# Patient Record
Sex: FEMALE | Race: WHITE | Employment: OTHER | URBAN - METROPOLITAN AREA
[De-identification: names, ages, dates, MRNs, and addresses within clinical notes are randomized per-mention and may not be internally consistent; named-entity substitution may affect disease eponyms.]

---

## 2018-03-13 ENCOUNTER — TELEPHONE (OUTPATIENT)
Dept: CARDIOLOGY CLINIC | Age: 83
End: 2018-03-13

## 2018-03-13 NOTE — TELEPHONE ENCOUNTER
I spoke to Amy Medley and gave her the message from Dr. Christina Corbin that is it fine for [facility] to stop Jantoven 3 days before procedure.

## 2018-03-13 NOTE — TELEPHONE ENCOUNTER
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

## 2018-05-25 ENCOUNTER — OFFICE VISIT (OUTPATIENT)
Dept: CARDIOLOGY CLINIC | Age: 83
End: 2018-05-25
Payer: MEDICARE

## 2018-05-25 VITALS
RESPIRATION RATE: 16 BRPM | HEIGHT: 58 IN | DIASTOLIC BLOOD PRESSURE: 82 MMHG | SYSTOLIC BLOOD PRESSURE: 140 MMHG | HEART RATE: 89 BPM | WEIGHT: 107 LBS | BODY MASS INDEX: 22.46 KG/M2

## 2018-05-25 DIAGNOSIS — I48.91 ATRIAL FIBRILLATION, UNSPECIFIED TYPE (HCC): Primary | ICD-10-CM

## 2018-05-25 PROCEDURE — 1036F TOBACCO NON-USER: CPT | Performed by: INTERNAL MEDICINE

## 2018-05-25 PROCEDURE — 1090F PRES/ABSN URINE INCON ASSESS: CPT | Performed by: INTERNAL MEDICINE

## 2018-05-25 PROCEDURE — G8598 ASA/ANTIPLAT THER USED: HCPCS | Performed by: INTERNAL MEDICINE

## 2018-05-25 PROCEDURE — 99214 OFFICE O/P EST MOD 30 MIN: CPT | Performed by: INTERNAL MEDICINE

## 2018-05-25 PROCEDURE — 1123F ACP DISCUSS/DSCN MKR DOCD: CPT | Performed by: INTERNAL MEDICINE

## 2018-05-25 PROCEDURE — 4040F PNEUMOC VAC/ADMIN/RCVD: CPT | Performed by: INTERNAL MEDICINE

## 2018-05-25 PROCEDURE — G8427 DOCREV CUR MEDS BY ELIG CLIN: HCPCS | Performed by: INTERNAL MEDICINE

## 2018-05-25 PROCEDURE — 93000 ELECTROCARDIOGRAM COMPLETE: CPT | Performed by: INTERNAL MEDICINE

## 2018-05-25 PROCEDURE — G8420 CALC BMI NORM PARAMETERS: HCPCS | Performed by: INTERNAL MEDICINE

## 2018-07-03 PROBLEM — I63.50 CEREBRAL ARTERY OCCLUSION WITH CEREBRAL INFARCTION (HCC): Status: ACTIVE | Noted: 2018-07-03

## 2019-01-01 ENCOUNTER — HOSPITAL ENCOUNTER (EMERGENCY)
Age: 84
Discharge: HOME | End: 2019-06-09
Attending: EMERGENCY MEDICINE
Payer: MEDICARE

## 2019-01-01 ENCOUNTER — APPOINTMENT (OUTPATIENT)
Dept: GENERAL RADIOLOGY | Age: 84
DRG: 388 | End: 2019-01-01
Attending: INTERNAL MEDICINE
Payer: MEDICARE

## 2019-01-01 ENCOUNTER — APPOINTMENT (OUTPATIENT)
Dept: GENERAL RADIOLOGY | Age: 84
End: 2019-01-01
Payer: MEDICARE

## 2019-01-01 ENCOUNTER — APPOINTMENT (OUTPATIENT)
Dept: CT IMAGING | Age: 84
DRG: 388 | End: 2019-01-01
Payer: MEDICARE

## 2019-01-01 ENCOUNTER — HOSPITAL ENCOUNTER (INPATIENT)
Age: 84
LOS: 5 days | Discharge: NURSING, REHAB OR LONG-TERM CARE | DRG: 388 | End: 2019-06-15
Attending: INTERNAL MEDICINE | Admitting: INTERNAL MEDICINE
Payer: MEDICARE

## 2019-01-01 ENCOUNTER — APPOINTMENT (OUTPATIENT)
Dept: CT IMAGING | Age: 84
DRG: 388 | End: 2019-01-01
Attending: INTERNAL MEDICINE
Payer: MEDICARE

## 2019-01-01 ENCOUNTER — HOSPITAL ENCOUNTER (EMERGENCY)
Age: 84
Discharge: HOME | DRG: 388 | End: 2019-06-08
Attending: EMERGENCY MEDICINE
Payer: MEDICARE

## 2019-01-01 VITALS
HEART RATE: 95 BPM | HEIGHT: 59 IN | BODY MASS INDEX: 21.31 KG/M2 | DIASTOLIC BLOOD PRESSURE: 76 MMHG | WEIGHT: 105.7 LBS | SYSTOLIC BLOOD PRESSURE: 126 MMHG | OXYGEN SATURATION: 94 % | RESPIRATION RATE: 16 BRPM | TEMPERATURE: 98 F

## 2019-01-01 VITALS
DIASTOLIC BLOOD PRESSURE: 88 MMHG | OXYGEN SATURATION: 95 % | WEIGHT: 105 LBS | SYSTOLIC BLOOD PRESSURE: 126 MMHG | RESPIRATION RATE: 22 BRPM | HEIGHT: 59 IN | HEART RATE: 105 BPM | BODY MASS INDEX: 21.17 KG/M2 | TEMPERATURE: 97.1 F

## 2019-01-01 VITALS
TEMPERATURE: 98.2 F | BODY MASS INDEX: 21.17 KG/M2 | DIASTOLIC BLOOD PRESSURE: 87 MMHG | OXYGEN SATURATION: 94 % | RESPIRATION RATE: 16 BRPM | HEART RATE: 87 BPM | HEIGHT: 59 IN | WEIGHT: 105 LBS | SYSTOLIC BLOOD PRESSURE: 180 MMHG

## 2019-01-01 DIAGNOSIS — R10.13 ABDOMINAL PAIN, EPIGASTRIC: Primary | ICD-10-CM

## 2019-01-01 DIAGNOSIS — R10.84 GENERALIZED ABDOMINAL PAIN: Primary | ICD-10-CM

## 2019-01-01 LAB
ALBUMIN SERPL-MCNC: 3.7 G/DL (ref 3.5–5.2)
ALBUMIN SERPL-MCNC: 4.1 G/DL (ref 3.5–5.2)
ALP BLD-CCNC: 69 U/L (ref 35–104)
ALP BLD-CCNC: 85 U/L (ref 35–104)
ALT SERPL-CCNC: 15 U/L (ref 0–32)
ALT SERPL-CCNC: 22 U/L (ref 0–32)
ANION GAP SERPL CALCULATED.3IONS-SCNC: 10 MMOL/L (ref 7–16)
ANION GAP SERPL CALCULATED.3IONS-SCNC: 10 MMOL/L (ref 7–16)
ANION GAP SERPL CALCULATED.3IONS-SCNC: 13 MMOL/L (ref 7–16)
ANION GAP SERPL CALCULATED.3IONS-SCNC: 14 MMOL/L (ref 7–16)
ANION GAP SERPL CALCULATED.3IONS-SCNC: 14 MMOL/L (ref 7–16)
ANION GAP SERPL CALCULATED.3IONS-SCNC: 16 MMOL/L (ref 7–16)
ANION GAP SERPL CALCULATED.3IONS-SCNC: 9 MMOL/L (ref 7–16)
ANION GAP SERPL CALCULATED.3IONS-SCNC: 9 MMOL/L (ref 7–16)
AST SERPL-CCNC: 30 U/L (ref 0–31)
AST SERPL-CCNC: 31 U/L (ref 0–31)
BACTERIA: ABNORMAL /HPF
BASOPHILS ABSOLUTE: 0.01 E9/L (ref 0–0.2)
BASOPHILS ABSOLUTE: 0.06 E9/L (ref 0–0.2)
BASOPHILS RELATIVE PERCENT: 0.1 % (ref 0–2)
BASOPHILS RELATIVE PERCENT: 0.5 % (ref 0–2)
BILIRUB SERPL-MCNC: 0.8 MG/DL (ref 0–1.2)
BILIRUB SERPL-MCNC: 1.1 MG/DL (ref 0–1.2)
BILIRUBIN URINE: NEGATIVE
BLOOD, URINE: ABNORMAL
BUN BLDV-MCNC: 10 MG/DL (ref 8–23)
BUN BLDV-MCNC: 13 MG/DL (ref 8–23)
BUN BLDV-MCNC: 20 MG/DL (ref 8–23)
BUN BLDV-MCNC: 21 MG/DL (ref 8–23)
BUN BLDV-MCNC: 22 MG/DL (ref 8–23)
BUN BLDV-MCNC: 23 MG/DL (ref 8–23)
BUN BLDV-MCNC: 24 MG/DL (ref 8–23)
BUN BLDV-MCNC: 29 MG/DL (ref 8–23)
BURR CELLS: ABNORMAL
CALCIUM SERPL-MCNC: 8.2 MG/DL (ref 8.6–10.2)
CALCIUM SERPL-MCNC: 8.5 MG/DL (ref 8.6–10.2)
CALCIUM SERPL-MCNC: 8.7 MG/DL (ref 8.6–10.2)
CALCIUM SERPL-MCNC: 8.7 MG/DL (ref 8.6–10.2)
CALCIUM SERPL-MCNC: 9 MG/DL (ref 8.6–10.2)
CALCIUM SERPL-MCNC: 9.1 MG/DL (ref 8.6–10.2)
CALCIUM SERPL-MCNC: 9.4 MG/DL (ref 8.6–10.2)
CALCIUM SERPL-MCNC: 9.4 MG/DL (ref 8.6–10.2)
CHLORIDE BLD-SCNC: 92 MMOL/L (ref 98–107)
CHLORIDE BLD-SCNC: 93 MMOL/L (ref 98–107)
CHLORIDE BLD-SCNC: 93 MMOL/L (ref 98–107)
CHLORIDE BLD-SCNC: 94 MMOL/L (ref 98–107)
CHLORIDE BLD-SCNC: 95 MMOL/L (ref 98–107)
CHLORIDE BLD-SCNC: 95 MMOL/L (ref 98–107)
CHLORIDE BLD-SCNC: 97 MMOL/L (ref 98–107)
CHLORIDE BLD-SCNC: 99 MMOL/L (ref 98–107)
CHLORIDE URINE RANDOM: 28 MMOL/L
CLARITY: CLEAR
CO2: 17 MMOL/L (ref 22–29)
CO2: 22 MMOL/L (ref 22–29)
CO2: 23 MMOL/L (ref 22–29)
CO2: 25 MMOL/L (ref 22–29)
CO2: 25 MMOL/L (ref 22–29)
CO2: 28 MMOL/L (ref 22–29)
COLOR: YELLOW
CREAT SERPL-MCNC: 0.7 MG/DL (ref 0.5–1)
CREAT SERPL-MCNC: 0.8 MG/DL (ref 0.5–1)
CREATININE URINE: 99 MG/DL (ref 29–226)
DIGOXIN LEVEL: 0.4 NG/ML (ref 0.8–2)
DIGOXIN LEVEL: 0.5 NG/ML (ref 0.8–2)
EKG ATRIAL RATE: 100 BPM
EKG ATRIAL RATE: 96 BPM
EKG Q-T INTERVAL: 306 MS
EKG Q-T INTERVAL: 320 MS
EKG QRS DURATION: 72 MS
EKG QRS DURATION: 76 MS
EKG QTC CALCULATION (BAZETT): 402 MS
EKG QTC CALCULATION (BAZETT): 438 MS
EKG R AXIS: 0 DEGREES
EKG R AXIS: 84 DEGREES
EKG T AXIS: -176 DEGREES
EKG T AXIS: -92 DEGREES
EKG VENTRICULAR RATE: 123 BPM
EKG VENTRICULAR RATE: 95 BPM
EOSINOPHILS ABSOLUTE: 0.01 E9/L (ref 0.05–0.5)
EOSINOPHILS ABSOLUTE: 0.03 E9/L (ref 0.05–0.5)
EOSINOPHILS RELATIVE PERCENT: 0.1 % (ref 0–6)
EOSINOPHILS RELATIVE PERCENT: 0.2 % (ref 0–6)
EPITHELIAL CELLS, UA: ABNORMAL /HPF
GFR AFRICAN AMERICAN: >60
GFR NON-AFRICAN AMERICAN: >60 ML/MIN/1.73
GLUCOSE BLD-MCNC: 106 MG/DL (ref 74–99)
GLUCOSE BLD-MCNC: 112 MG/DL (ref 74–99)
GLUCOSE BLD-MCNC: 134 MG/DL (ref 74–99)
GLUCOSE BLD-MCNC: 77 MG/DL (ref 74–99)
GLUCOSE BLD-MCNC: 79 MG/DL (ref 74–99)
GLUCOSE BLD-MCNC: 83 MG/DL (ref 74–99)
GLUCOSE BLD-MCNC: 87 MG/DL (ref 74–99)
GLUCOSE BLD-MCNC: 94 MG/DL (ref 74–99)
GLUCOSE URINE: NEGATIVE MG/DL
HCT VFR BLD CALC: 43.9 % (ref 34–48)
HCT VFR BLD CALC: 44.6 % (ref 34–48)
HCT VFR BLD CALC: 46.1 % (ref 34–48)
HEMOGLOBIN: 14.9 G/DL (ref 11.5–15.5)
HEMOGLOBIN: 15.1 G/DL (ref 11.5–15.5)
HEMOGLOBIN: 15.1 G/DL (ref 11.5–15.5)
IMMATURE GRANULOCYTES #: 0.05 E9/L
IMMATURE GRANULOCYTES #: 0.05 E9/L
IMMATURE GRANULOCYTES %: 0.4 % (ref 0–5)
IMMATURE GRANULOCYTES %: 0.5 % (ref 0–5)
INR BLD: 1.1
INR BLD: 1.1
INR BLD: 1.4
INR BLD: 1.7
INR BLD: 4.6
INR BLD: 7.6
KETONES, URINE: 15 MG/DL
LACTIC ACID: 1.1 MMOL/L (ref 0.5–2.2)
LACTIC ACID: 1.8 MMOL/L (ref 0.5–2.2)
LACTIC ACID: 3 MMOL/L (ref 0.5–2.2)
LEUKOCYTE ESTERASE, URINE: ABNORMAL
LIPASE: 17 U/L (ref 13–60)
LIPASE: 29 U/L (ref 13–60)
LYMPHOCYTES ABSOLUTE: 0.47 E9/L (ref 1.5–4)
LYMPHOCYTES ABSOLUTE: 0.89 E9/L (ref 1.5–4)
LYMPHOCYTES RELATIVE PERCENT: 4.5 % (ref 20–42)
LYMPHOCYTES RELATIVE PERCENT: 7.2 % (ref 20–42)
MAGNESIUM: 1.7 MG/DL (ref 1.6–2.6)
MCH RBC QN AUTO: 32.4 PG (ref 26–35)
MCH RBC QN AUTO: 32.4 PG (ref 26–35)
MCH RBC QN AUTO: 32.9 PG (ref 26–35)
MCHC RBC AUTO-ENTMCNC: 32.8 % (ref 32–34.5)
MCHC RBC AUTO-ENTMCNC: 33.4 % (ref 32–34.5)
MCHC RBC AUTO-ENTMCNC: 34.4 % (ref 32–34.5)
MCV RBC AUTO: 95.6 FL (ref 80–99.9)
MCV RBC AUTO: 97 FL (ref 80–99.9)
MCV RBC AUTO: 98.9 FL (ref 80–99.9)
MONOCYTES ABSOLUTE: 0.6 E9/L (ref 0.1–0.95)
MONOCYTES ABSOLUTE: 0.81 E9/L (ref 0.1–0.95)
MONOCYTES RELATIVE PERCENT: 5.8 % (ref 2–12)
MONOCYTES RELATIVE PERCENT: 6.6 % (ref 2–12)
NEUTROPHILS ABSOLUTE: 10.52 E9/L (ref 1.8–7.3)
NEUTROPHILS ABSOLUTE: 9.29 E9/L (ref 1.8–7.3)
NEUTROPHILS RELATIVE PERCENT: 85.1 % (ref 43–80)
NEUTROPHILS RELATIVE PERCENT: 89 % (ref 43–80)
NITRITE, URINE: NEGATIVE
OVALOCYTES: ABNORMAL
PDW BLD-RTO: 12.6 FL (ref 11.5–15)
PDW BLD-RTO: 12.7 FL (ref 11.5–15)
PDW BLD-RTO: 13 FL (ref 11.5–15)
PH UA: 7 (ref 5–9)
PLATELET # BLD: 193 E9/L (ref 130–450)
PLATELET # BLD: 205 E9/L (ref 130–450)
PLATELET # BLD: 214 E9/L (ref 130–450)
PMV BLD AUTO: 10.1 FL (ref 7–12)
PMV BLD AUTO: 10.3 FL (ref 7–12)
PMV BLD AUTO: 10.6 FL (ref 7–12)
POIKILOCYTES: ABNORMAL
POLYCHROMASIA: ABNORMAL
POTASSIUM SERPL-SCNC: 3.4 MMOL/L (ref 3.5–5)
POTASSIUM SERPL-SCNC: 3.8 MMOL/L (ref 3.5–5)
POTASSIUM SERPL-SCNC: 4 MMOL/L (ref 3.5–5)
POTASSIUM SERPL-SCNC: 4.2 MMOL/L (ref 3.5–5)
POTASSIUM SERPL-SCNC: 4.2 MMOL/L (ref 3.5–5)
POTASSIUM SERPL-SCNC: 4.6 MMOL/L (ref 3.5–5)
POTASSIUM SERPL-SCNC: 4.7 MMOL/L (ref 3.5–5)
POTASSIUM SERPL-SCNC: 4.7 MMOL/L (ref 3.5–5)
PRO-BNP: 3876 PG/ML (ref 0–450)
PROTEIN UA: NEGATIVE MG/DL
PROTHROMBIN TIME: 12.6 SEC (ref 9.3–12.4)
PROTHROMBIN TIME: 12.8 SEC (ref 9.3–12.4)
PROTHROMBIN TIME: 15.9 SEC (ref 9.3–12.4)
PROTHROMBIN TIME: 18.9 SEC (ref 9.3–12.4)
PROTHROMBIN TIME: 52.2 SEC (ref 9.3–12.4)
PROTHROMBIN TIME: 84.2 SEC (ref 9.3–12.4)
RBC # BLD: 4.59 E12/L (ref 3.5–5.5)
RBC # BLD: 4.6 E12/L (ref 3.5–5.5)
RBC # BLD: 4.66 E12/L (ref 3.5–5.5)
RBC UA: ABNORMAL /HPF (ref 0–2)
REASON FOR REJECTION: NORMAL
REJECTED TEST: NORMAL
RENAL EPITHELIAL, UA: ABNORMAL /HPF
SODIUM BLD-SCNC: 125 MMOL/L (ref 132–146)
SODIUM BLD-SCNC: 128 MMOL/L (ref 132–146)
SODIUM BLD-SCNC: 128 MMOL/L (ref 132–146)
SODIUM BLD-SCNC: 129 MMOL/L (ref 132–146)
SODIUM BLD-SCNC: 131 MMOL/L (ref 132–146)
SODIUM BLD-SCNC: 131 MMOL/L (ref 132–146)
SODIUM BLD-SCNC: 132 MMOL/L (ref 132–146)
SODIUM BLD-SCNC: 133 MMOL/L (ref 132–146)
SODIUM URINE: <20 MMOL/L
SPECIFIC GRAVITY UA: 1.01 (ref 1–1.03)
TOTAL PROTEIN: 6.3 G/DL (ref 6.4–8.3)
TOTAL PROTEIN: 7.3 G/DL (ref 6.4–8.3)
TROPONIN: <0.01 NG/ML (ref 0–0.03)
TROPONIN: <0.01 NG/ML (ref 0–0.03)
UROBILINOGEN, URINE: 0.2 E.U./DL
WBC # BLD: 10.4 E9/L (ref 4.5–11.5)
WBC # BLD: 12.4 E9/L (ref 4.5–11.5)
WBC # BLD: 9.4 E9/L (ref 4.5–11.5)
WBC UA: ABNORMAL /HPF (ref 0–5)

## 2019-01-01 PROCEDURE — 96374 THER/PROPH/DIAG INJ IV PUSH: CPT

## 2019-01-01 PROCEDURE — 85610 PROTHROMBIN TIME: CPT

## 2019-01-01 PROCEDURE — 1200000000 HC SEMI PRIVATE

## 2019-01-01 PROCEDURE — 97530 THERAPEUTIC ACTIVITIES: CPT

## 2019-01-01 PROCEDURE — 6360000002 HC RX W HCPCS: Performed by: EMERGENCY MEDICINE

## 2019-01-01 PROCEDURE — 96375 TX/PRO/DX INJ NEW DRUG ADDON: CPT

## 2019-01-01 PROCEDURE — 83605 ASSAY OF LACTIC ACID: CPT

## 2019-01-01 PROCEDURE — 36415 COLL VENOUS BLD VENIPUNCTURE: CPT

## 2019-01-01 PROCEDURE — 83735 ASSAY OF MAGNESIUM: CPT

## 2019-01-01 PROCEDURE — 6360000002 HC RX W HCPCS: Performed by: INTERNAL MEDICINE

## 2019-01-01 PROCEDURE — 84300 ASSAY OF URINE SODIUM: CPT

## 2019-01-01 PROCEDURE — 80048 BASIC METABOLIC PNL TOTAL CA: CPT

## 2019-01-01 PROCEDURE — 2580000003 HC RX 258: Performed by: RADIOLOGY

## 2019-01-01 PROCEDURE — 82436 ASSAY OF URINE CHLORIDE: CPT

## 2019-01-01 PROCEDURE — 81001 URINALYSIS AUTO W/SCOPE: CPT

## 2019-01-01 PROCEDURE — 74177 CT ABD & PELVIS W/CONTRAST: CPT

## 2019-01-01 PROCEDURE — 6370000000 HC RX 637 (ALT 250 FOR IP): Performed by: INTERNAL MEDICINE

## 2019-01-01 PROCEDURE — 80053 COMPREHEN METABOLIC PANEL: CPT

## 2019-01-01 PROCEDURE — 82570 ASSAY OF URINE CREATININE: CPT

## 2019-01-01 PROCEDURE — 80162 ASSAY OF DIGOXIN TOTAL: CPT

## 2019-01-01 PROCEDURE — 6360000002 HC RX W HCPCS: Performed by: STUDENT IN AN ORGANIZED HEALTH CARE EDUCATION/TRAINING PROGRAM

## 2019-01-01 PROCEDURE — 83690 ASSAY OF LIPASE: CPT

## 2019-01-01 PROCEDURE — 2580000003 HC RX 258: Performed by: NURSE PRACTITIONER

## 2019-01-01 PROCEDURE — 85027 COMPLETE CBC AUTOMATED: CPT

## 2019-01-01 PROCEDURE — 85025 COMPLETE CBC W/AUTO DIFF WBC: CPT

## 2019-01-01 PROCEDURE — 93010 ELECTROCARDIOGRAM REPORT: CPT | Performed by: INTERNAL MEDICINE

## 2019-01-01 PROCEDURE — 2580000003 HC RX 258: Performed by: INTERNAL MEDICINE

## 2019-01-01 PROCEDURE — 6360000004 HC RX CONTRAST MEDICATION: Performed by: RADIOLOGY

## 2019-01-01 PROCEDURE — 99284 EMERGENCY DEPT VISIT MOD MDM: CPT

## 2019-01-01 PROCEDURE — 97165 OT EVAL LOW COMPLEX 30 MIN: CPT

## 2019-01-01 PROCEDURE — 93005 ELECTROCARDIOGRAM TRACING: CPT | Performed by: NURSE PRACTITIONER

## 2019-01-01 PROCEDURE — 93005 ELECTROCARDIOGRAM TRACING: CPT | Performed by: EMERGENCY MEDICINE

## 2019-01-01 PROCEDURE — 71045 X-RAY EXAM CHEST 1 VIEW: CPT

## 2019-01-01 PROCEDURE — 84484 ASSAY OF TROPONIN QUANT: CPT

## 2019-01-01 PROCEDURE — 74018 RADEX ABDOMEN 1 VIEW: CPT

## 2019-01-01 PROCEDURE — 97161 PT EVAL LOW COMPLEX 20 MIN: CPT

## 2019-01-01 PROCEDURE — 83880 ASSAY OF NATRIURETIC PEPTIDE: CPT

## 2019-01-01 PROCEDURE — 2580000003 HC RX 258: Performed by: EMERGENCY MEDICINE

## 2019-01-01 PROCEDURE — 6360000002 HC RX W HCPCS: Performed by: NURSE PRACTITIONER

## 2019-01-01 PROCEDURE — 97535 SELF CARE MNGMENT TRAINING: CPT

## 2019-01-01 RX ORDER — METOPROLOL TARTRATE 50 MG/1
50 TABLET, FILM COATED ORAL 2 TIMES DAILY
Status: DISCONTINUED | OUTPATIENT
Start: 2019-01-01 | End: 2019-01-01 | Stop reason: HOSPADM

## 2019-01-01 RX ORDER — WARFARIN SODIUM 5 MG/1
TABLET ORAL
Qty: 30 TABLET | Refills: 3 | Status: SHIPPED | OUTPATIENT
Start: 2019-01-01

## 2019-01-01 RX ORDER — FUROSEMIDE 10 MG/ML
20 INJECTION INTRAMUSCULAR; INTRAVENOUS ONCE
Status: COMPLETED | OUTPATIENT
Start: 2019-01-01 | End: 2019-01-01

## 2019-01-01 RX ORDER — SUCRALFATE 1 G/1
1 TABLET ORAL 4 TIMES DAILY
Status: DISCONTINUED | OUTPATIENT
Start: 2019-01-01 | End: 2019-01-01 | Stop reason: HOSPADM

## 2019-01-01 RX ORDER — ACETAMINOPHEN 325 MG/1
650 TABLET ORAL EVERY 4 HOURS PRN
Status: DISCONTINUED | OUTPATIENT
Start: 2019-01-01 | End: 2019-01-01 | Stop reason: HOSPADM

## 2019-01-01 RX ORDER — SODIUM CHLORIDE 9 MG/ML
INJECTION, SOLUTION INTRAVENOUS CONTINUOUS
Status: DISCONTINUED | OUTPATIENT
Start: 2019-01-01 | End: 2019-01-01

## 2019-01-01 RX ORDER — SUCRALFATE 1 G/1
1 TABLET ORAL 4 TIMES DAILY
Qty: 60 TABLET | Refills: 3 | Status: SHIPPED | OUTPATIENT
Start: 2019-01-01

## 2019-01-01 RX ORDER — SIMVASTATIN 40 MG
40 TABLET ORAL NIGHTLY
Status: DISCONTINUED | OUTPATIENT
Start: 2019-01-01 | End: 2019-01-01 | Stop reason: HOSPADM

## 2019-01-01 RX ORDER — ONDANSETRON 4 MG/1
4 TABLET, ORALLY DISINTEGRATING ORAL EVERY 8 HOURS PRN
Qty: 12 TABLET | Refills: 0 | Status: SHIPPED | OUTPATIENT
Start: 2019-01-01

## 2019-01-01 RX ORDER — DIGOXIN 125 MCG
125 TABLET ORAL DAILY
Status: DISCONTINUED | OUTPATIENT
Start: 2019-01-01 | End: 2019-01-01 | Stop reason: HOSPADM

## 2019-01-01 RX ORDER — LISINOPRIL 20 MG/1
20 TABLET ORAL DAILY
Status: DISCONTINUED | OUTPATIENT
Start: 2019-01-01 | End: 2019-01-01 | Stop reason: HOSPADM

## 2019-01-01 RX ORDER — FUROSEMIDE 20 MG/1
20 TABLET ORAL DAILY
Qty: 60 TABLET | Refills: 3 | Status: SHIPPED | OUTPATIENT
Start: 2019-01-01

## 2019-01-01 RX ORDER — ONDANSETRON 2 MG/ML
4 INJECTION INTRAMUSCULAR; INTRAVENOUS EVERY 6 HOURS PRN
Status: DISCONTINUED | OUTPATIENT
Start: 2019-01-01 | End: 2019-01-01 | Stop reason: HOSPADM

## 2019-01-01 RX ORDER — POTASSIUM CHLORIDE 20 MEQ/1
40 TABLET, EXTENDED RELEASE ORAL ONCE
Status: COMPLETED | OUTPATIENT
Start: 2019-01-01 | End: 2019-01-01

## 2019-01-01 RX ORDER — AMLODIPINE BESYLATE 5 MG/1
5 TABLET ORAL DAILY
Status: DISCONTINUED | OUTPATIENT
Start: 2019-01-01 | End: 2019-01-01 | Stop reason: HOSPADM

## 2019-01-01 RX ORDER — ASCORBIC ACID 500 MG
500 TABLET ORAL DAILY
Status: DISCONTINUED | OUTPATIENT
Start: 2019-01-01 | End: 2019-01-01 | Stop reason: HOSPADM

## 2019-01-01 RX ORDER — ONDANSETRON 2 MG/ML
4 INJECTION INTRAMUSCULAR; INTRAVENOUS ONCE
Status: COMPLETED | OUTPATIENT
Start: 2019-01-01 | End: 2019-01-01

## 2019-01-01 RX ORDER — AMLODIPINE BESYLATE 5 MG/1
5 TABLET ORAL DAILY
Qty: 30 TABLET | Refills: 3 | Status: SHIPPED | OUTPATIENT
Start: 2019-01-01

## 2019-01-01 RX ORDER — MORPHINE SULFATE 2 MG/ML
1 INJECTION, SOLUTION INTRAMUSCULAR; INTRAVENOUS ONCE
Status: COMPLETED | OUTPATIENT
Start: 2019-01-01 | End: 2019-01-01

## 2019-01-01 RX ORDER — SODIUM CHLORIDE 0.9 % (FLUSH) 0.9 %
10 SYRINGE (ML) INJECTION PRN
Status: DISCONTINUED | OUTPATIENT
Start: 2019-01-01 | End: 2019-01-01 | Stop reason: HOSPADM

## 2019-01-01 RX ORDER — WARFARIN SODIUM 5 MG/1
5 TABLET ORAL DAILY
Status: DISCONTINUED | OUTPATIENT
Start: 2019-01-01 | End: 2019-01-01 | Stop reason: HOSPADM

## 2019-01-01 RX ORDER — CHOLECALCIFEROL (VITAMIN D3) 50 MCG
2000 TABLET ORAL DAILY
Status: DISCONTINUED | OUTPATIENT
Start: 2019-01-01 | End: 2019-01-01 | Stop reason: HOSPADM

## 2019-01-01 RX ORDER — 0.9 % SODIUM CHLORIDE 0.9 %
1000 INTRAVENOUS SOLUTION INTRAVENOUS ONCE
Status: COMPLETED | OUTPATIENT
Start: 2019-01-01 | End: 2019-01-01

## 2019-01-01 RX ORDER — 0.9 % SODIUM CHLORIDE 0.9 %
250 INTRAVENOUS SOLUTION INTRAVENOUS ONCE
Status: COMPLETED | OUTPATIENT
Start: 2019-01-01 | End: 2019-01-01

## 2019-01-01 RX ORDER — KETOROLAC TROMETHAMINE 30 MG/ML
15 INJECTION, SOLUTION INTRAMUSCULAR; INTRAVENOUS ONCE
Status: COMPLETED | OUTPATIENT
Start: 2019-01-01 | End: 2019-01-01

## 2019-01-01 RX ADMIN — KETOROLAC TROMETHAMINE 15 MG: 30 INJECTION, SOLUTION INTRAMUSCULAR at 11:52

## 2019-01-01 RX ADMIN — SUCRALFATE 1 G: 1 TABLET ORAL at 07:44

## 2019-01-01 RX ADMIN — METOPROLOL TARTRATE 50 MG: 50 TABLET ORAL at 20:05

## 2019-01-01 RX ADMIN — SUCRALFATE 1 G: 1 TABLET ORAL at 14:10

## 2019-01-01 RX ADMIN — SIMVASTATIN 40 MG: 40 TABLET, FILM COATED ORAL at 20:34

## 2019-01-01 RX ADMIN — ACETAMINOPHEN 650 MG: 325 TABLET ORAL at 04:00

## 2019-01-01 RX ADMIN — LISINOPRIL 20 MG: 20 TABLET ORAL at 10:16

## 2019-01-01 RX ADMIN — SIMVASTATIN 40 MG: 40 TABLET, FILM COATED ORAL at 21:17

## 2019-01-01 RX ADMIN — IOPAMIDOL 110 ML: 755 INJECTION, SOLUTION INTRAVENOUS at 09:49

## 2019-01-01 RX ADMIN — Medication 10 ML: at 10:14

## 2019-01-01 RX ADMIN — SUCRALFATE 1 G: 1 TABLET ORAL at 09:46

## 2019-01-01 RX ADMIN — DIGOXIN 125 MCG: 125 TABLET ORAL at 08:20

## 2019-01-01 RX ADMIN — AMLODIPINE BESYLATE 5 MG: 5 TABLET ORAL at 16:50

## 2019-01-01 RX ADMIN — CHOLECALCIFEROL TAB 50 MCG (2000 UNIT) 2000 UNITS: 50 TAB at 10:13

## 2019-01-01 RX ADMIN — IOPAMIDOL 110 ML: 755 INJECTION, SOLUTION INTRAVENOUS at 21:07

## 2019-01-01 RX ADMIN — Medication 500 MG: at 09:23

## 2019-01-01 RX ADMIN — DIGOXIN 125 MCG: 125 TABLET ORAL at 09:46

## 2019-01-01 RX ADMIN — SUCRALFATE 1 G: 1 TABLET ORAL at 20:05

## 2019-01-01 RX ADMIN — POTASSIUM CHLORIDE 40 MEQ: 20 TABLET, EXTENDED RELEASE ORAL at 11:48

## 2019-01-01 RX ADMIN — SIMVASTATIN 40 MG: 40 TABLET, FILM COATED ORAL at 21:10

## 2019-01-01 RX ADMIN — CHOLECALCIFEROL TAB 50 MCG (2000 UNIT) 2000 UNITS: 50 TAB at 07:44

## 2019-01-01 RX ADMIN — LISINOPRIL 20 MG: 20 TABLET ORAL at 09:23

## 2019-01-01 RX ADMIN — SIMVASTATIN 40 MG: 40 TABLET, FILM COATED ORAL at 20:05

## 2019-01-01 RX ADMIN — LISINOPRIL 20 MG: 20 TABLET ORAL at 07:44

## 2019-01-01 RX ADMIN — Medication 500 MG: at 18:08

## 2019-01-01 RX ADMIN — SUCRALFATE 1 G: 1 TABLET ORAL at 21:17

## 2019-01-01 RX ADMIN — Medication 10 ML: at 20:35

## 2019-01-01 RX ADMIN — AMLODIPINE BESYLATE 5 MG: 5 TABLET ORAL at 10:13

## 2019-01-01 RX ADMIN — METOPROLOL TARTRATE 50 MG: 50 TABLET ORAL at 21:17

## 2019-01-01 RX ADMIN — MORPHINE SULFATE 1 MG: 2 INJECTION, SOLUTION INTRAMUSCULAR; INTRAVENOUS at 20:15

## 2019-01-01 RX ADMIN — LISINOPRIL 20 MG: 20 TABLET ORAL at 09:46

## 2019-01-01 RX ADMIN — DIGOXIN 125 MCG: 125 TABLET ORAL at 07:43

## 2019-01-01 RX ADMIN — ONDANSETRON 4 MG: 2 INJECTION INTRAMUSCULAR; INTRAVENOUS at 20:05

## 2019-01-01 RX ADMIN — ONDANSETRON 4 MG: 2 INJECTION INTRAMUSCULAR; INTRAVENOUS at 21:10

## 2019-01-01 RX ADMIN — SUCRALFATE 1 G: 1 TABLET ORAL at 16:50

## 2019-01-01 RX ADMIN — ACETAMINOPHEN 650 MG: 325 TABLET ORAL at 19:46

## 2019-01-01 RX ADMIN — CHOLECALCIFEROL TAB 50 MCG (2000 UNIT) 2000 UNITS: 50 TAB at 09:46

## 2019-01-01 RX ADMIN — SUCRALFATE 1 G: 1 TABLET ORAL at 18:08

## 2019-01-01 RX ADMIN — Medication 10 ML: at 09:49

## 2019-01-01 RX ADMIN — METOPROLOL TARTRATE 50 MG: 50 TABLET ORAL at 07:44

## 2019-01-01 RX ADMIN — Medication 500 MG: at 07:44

## 2019-01-01 RX ADMIN — DIGOXIN 125 MCG: 125 TABLET ORAL at 18:08

## 2019-01-01 RX ADMIN — SUCRALFATE 1 G: 1 TABLET ORAL at 17:33

## 2019-01-01 RX ADMIN — SUCRALFATE 1 G: 1 TABLET ORAL at 09:24

## 2019-01-01 RX ADMIN — METOPROLOL TARTRATE 50 MG: 50 TABLET ORAL at 20:35

## 2019-01-01 RX ADMIN — SUCRALFATE 1 G: 1 TABLET ORAL at 10:13

## 2019-01-01 RX ADMIN — CHOLECALCIFEROL TAB 50 MCG (2000 UNIT) 2000 UNITS: 50 TAB at 09:23

## 2019-01-01 RX ADMIN — SUCRALFATE 1 G: 1 TABLET ORAL at 13:06

## 2019-01-01 RX ADMIN — METOPROLOL TARTRATE 50 MG: 50 TABLET ORAL at 08:20

## 2019-01-01 RX ADMIN — ENOXAPARIN SODIUM 50 MG: 100 INJECTION SUBCUTANEOUS at 20:34

## 2019-01-01 RX ADMIN — LISINOPRIL 20 MG: 20 TABLET ORAL at 08:20

## 2019-01-01 RX ADMIN — FUROSEMIDE 20 MG: 10 INJECTION, SOLUTION INTRAVENOUS at 10:13

## 2019-01-01 RX ADMIN — Medication 500 MG: at 10:13

## 2019-01-01 RX ADMIN — SODIUM CHLORIDE: 9 INJECTION, SOLUTION INTRAVENOUS at 18:08

## 2019-01-01 RX ADMIN — FUROSEMIDE 20 MG: 10 INJECTION, SOLUTION INTRAVENOUS at 07:30

## 2019-01-01 RX ADMIN — METOPROLOL TARTRATE 50 MG: 50 TABLET ORAL at 10:13

## 2019-01-01 RX ADMIN — SODIUM CHLORIDE 1000 ML: 9 INJECTION, SOLUTION INTRAVENOUS at 11:52

## 2019-01-01 RX ADMIN — METOPROLOL TARTRATE 50 MG: 50 TABLET ORAL at 21:10

## 2019-01-01 RX ADMIN — SUCRALFATE 1 G: 1 TABLET ORAL at 21:10

## 2019-01-01 RX ADMIN — ENOXAPARIN SODIUM 50 MG: 100 INJECTION SUBCUTANEOUS at 09:24

## 2019-01-01 RX ADMIN — IOHEXOL 50 ML: 240 INJECTION, SOLUTION INTRATHECAL; INTRAVASCULAR; INTRAVENOUS; ORAL at 09:48

## 2019-01-01 RX ADMIN — SUCRALFATE 1 G: 1 TABLET ORAL at 14:20

## 2019-01-01 RX ADMIN — ACETAMINOPHEN 650 MG: 325 TABLET ORAL at 12:55

## 2019-01-01 RX ADMIN — METOPROLOL TARTRATE 50 MG: 50 TABLET ORAL at 09:23

## 2019-01-01 RX ADMIN — CHOLECALCIFEROL TAB 50 MCG (2000 UNIT) 2000 UNITS: 50 TAB at 08:20

## 2019-01-01 RX ADMIN — WARFARIN SODIUM 5 MG: 5 TABLET ORAL at 17:33

## 2019-01-01 RX ADMIN — ENOXAPARIN SODIUM 50 MG: 100 INJECTION SUBCUTANEOUS at 10:14

## 2019-01-01 RX ADMIN — AMLODIPINE BESYLATE 5 MG: 5 TABLET ORAL at 08:20

## 2019-01-01 RX ADMIN — METOPROLOL TARTRATE 50 MG: 50 TABLET ORAL at 09:46

## 2019-01-01 RX ADMIN — AMLODIPINE BESYLATE 5 MG: 5 TABLET ORAL at 09:23

## 2019-01-01 RX ADMIN — CHOLECALCIFEROL TAB 50 MCG (2000 UNIT) 2000 UNITS: 50 TAB at 18:08

## 2019-01-01 RX ADMIN — SODIUM CHLORIDE 250 ML: 9 INJECTION, SOLUTION INTRAVENOUS at 20:14

## 2019-01-01 RX ADMIN — SUCRALFATE 1 G: 1 TABLET ORAL at 17:08

## 2019-01-01 RX ADMIN — Medication 500 MG: at 09:46

## 2019-01-01 RX ADMIN — DIGOXIN 125 MCG: 125 TABLET ORAL at 10:14

## 2019-01-01 RX ADMIN — ONDANSETRON HYDROCHLORIDE 4 MG: 2 SOLUTION INTRAMUSCULAR; INTRAVENOUS at 11:52

## 2019-01-01 RX ADMIN — ACETAMINOPHEN 650 MG: 325 TABLET ORAL at 10:14

## 2019-01-01 RX ADMIN — SUCRALFATE 1 G: 1 TABLET ORAL at 20:34

## 2019-01-01 RX ADMIN — PHYTONADIONE 2 MG: 10 INJECTION, EMULSION INTRAMUSCULAR; INTRAVENOUS; SUBCUTANEOUS at 07:30

## 2019-01-01 RX ADMIN — SUCRALFATE 1 G: 1 TABLET ORAL at 08:20

## 2019-01-01 RX ADMIN — ONDANSETRON 4 MG: 2 INJECTION INTRAMUSCULAR; INTRAVENOUS at 20:15

## 2019-01-01 RX ADMIN — Medication 500 MG: at 08:20

## 2019-01-01 RX ADMIN — DIGOXIN 125 MCG: 125 TABLET ORAL at 09:24

## 2019-01-01 ASSESSMENT — ENCOUNTER SYMPTOMS
SHORTNESS OF BREATH: 0
BLOOD IN STOOL: 0
NAUSEA: 1
BACK PAIN: 0
COUGH: 0
ABDOMINAL PAIN: 1
VOMITING: 0

## 2019-01-01 ASSESSMENT — PAIN DESCRIPTION - PAIN TYPE
TYPE: ACUTE PAIN

## 2019-01-01 ASSESSMENT — PAIN SCALES - GENERAL
PAINLEVEL_OUTOF10: 9
PAINLEVEL_OUTOF10: 4
PAINLEVEL_OUTOF10: 5
PAINLEVEL_OUTOF10: 9
PAINLEVEL_OUTOF10: 0
PAINLEVEL_OUTOF10: 3
PAINLEVEL_OUTOF10: 4
PAINLEVEL_OUTOF10: 0
PAINLEVEL_OUTOF10: 4
PAINLEVEL_OUTOF10: 3
PAINLEVEL_OUTOF10: 3
PAINLEVEL_OUTOF10: 9
PAINLEVEL_OUTOF10: 0
PAINLEVEL_OUTOF10: 0
PAINLEVEL_OUTOF10: 4
PAINLEVEL_OUTOF10: 3
PAINLEVEL_OUTOF10: 0
PAINLEVEL_OUTOF10: 9
PAINLEVEL_OUTOF10: 3
PAINLEVEL_OUTOF10: 0
PAINLEVEL_OUTOF10: 5

## 2019-01-01 ASSESSMENT — PAIN DESCRIPTION - LOCATION
LOCATION: LEG
LOCATION: ABDOMEN
LOCATION: HIP
LOCATION: LEG;HIP
LOCATION: LEG;HIP

## 2019-01-01 ASSESSMENT — PAIN DESCRIPTION - PROGRESSION
CLINICAL_PROGRESSION: GRADUALLY IMPROVING
CLINICAL_PROGRESSION: GRADUALLY WORSENING
CLINICAL_PROGRESSION: NOT CHANGED
CLINICAL_PROGRESSION: NOT CHANGED
CLINICAL_PROGRESSION: GRADUALLY WORSENING
CLINICAL_PROGRESSION: GRADUALLY WORSENING

## 2019-01-01 ASSESSMENT — PAIN DESCRIPTION - DESCRIPTORS
DESCRIPTORS: ACHING;CONSTANT;DISCOMFORT
DESCRIPTORS: DULL
DESCRIPTORS: SHARP
DESCRIPTORS: DULL;SHARP
DESCRIPTORS: ACHING;DISCOMFORT;DULL
DESCRIPTORS: STABBING;ACHING
DESCRIPTORS: ACHING;DISCOMFORT
DESCRIPTORS: ACHING;DISCOMFORT

## 2019-01-01 ASSESSMENT — PAIN - FUNCTIONAL ASSESSMENT
PAIN_FUNCTIONAL_ASSESSMENT: ACTIVITIES ARE NOT PREVENTED
PAIN_FUNCTIONAL_ASSESSMENT: ACTIVITIES ARE NOT PREVENTED
PAIN_FUNCTIONAL_ASSESSMENT: PREVENTS OR INTERFERES SOME ACTIVE ACTIVITIES AND ADLS
PAIN_FUNCTIONAL_ASSESSMENT: PREVENTS OR INTERFERES SOME ACTIVE ACTIVITIES AND ADLS
PAIN_FUNCTIONAL_ASSESSMENT: ACTIVITIES ARE NOT PREVENTED
PAIN_FUNCTIONAL_ASSESSMENT: ACTIVITIES ARE NOT PREVENTED

## 2019-01-01 ASSESSMENT — PAIN DESCRIPTION - ONSET
ONSET: ON-GOING
ONSET: GRADUAL
ONSET: ON-GOING

## 2019-01-01 ASSESSMENT — PAIN DESCRIPTION - ORIENTATION
ORIENTATION: RIGHT;LEFT;MID;UPPER
ORIENTATION: LEFT
ORIENTATION: RIGHT;LEFT
ORIENTATION: LEFT
ORIENTATION: RIGHT;LEFT;MID

## 2019-01-01 ASSESSMENT — PAIN DESCRIPTION - FREQUENCY
FREQUENCY: CONTINUOUS

## 2019-06-08 NOTE — ED PROVIDER NOTES
Pepper Putnam is an 77-year-old female who presents the ED with a complaint of abdominal pain. She states that she developed abdominal pain 2 days ago. She describes it as an aching sensation that occasionally becomes sharp. She states that the pain is currently a 9 out of 10 on a pain scale. She says nothing worsens or improves the pain. She denies ever experiencing this in the past. She states that she did feel slightly nauseated earlier but denies any vomiting. She denies any diarrhea, constipation, fever, chills, dysuria or urinary frequency. Review of Systems   Constitutional: Negative for chills and fever. Respiratory: Negative for cough and shortness of breath. Cardiovascular: Negative for chest pain. Gastrointestinal: Positive for abdominal pain and nausea. Negative for blood in stool and vomiting. Genitourinary: Negative for dysuria and frequency. Musculoskeletal: Negative for back pain. Skin: Negative for rash. Neurological: Negative for weakness and headaches. All other systems reviewed and are negative. Physical Exam   Constitutional: She is oriented to person, place, and time. She appears well-developed and well-nourished. HENT:   Head: Normocephalic and atraumatic. Eyes: Pupils are equal, round, and reactive to light. Neck: Normal range of motion. Neck supple. Cardiovascular: Normal rate and regular rhythm. Pulmonary/Chest: Effort normal and breath sounds normal. No respiratory distress. She has no wheezes. She has no rales. Abdominal: Soft. Bowel sounds are normal. There is tenderness. There is no rebound and no guarding. Periumbilical ttp   Musculoskeletal: She exhibits no edema. Neurological: She is alert and oriented to person, place, and time. No cranial nerve deficit. Coordination normal.   Skin: Skin is warm and dry. Nursing note and vitals reviewed.       Procedures    MDM  Number of Diagnoses or Management Options  Generalized abdominal pain: Diagnosis management comments: Patient presented with abdominal pain. Plan is for labs, CT abdomen, fluids. There was a mild leukocytosis present. LFTs and lipase within appropriate range. Lactic at 1.1. CT abdomen resulted in nonspecific changes suggesting enteritis. Urinalysis negative for infection. Patient was treated with morphine with improvement in symptoms. She was discharged with Carafate and zofran and advised to follow-up with her PCP. She was given strict return instructions.                --------------------------------------------- PAST HISTORY ---------------------------------------------  Past Medical History:  has a past medical history of AS (ankylosing spondylitis) ([facility]), [facility] CH[facility]congestive heart failure) [facility] H/O TIA (transient ischemic attack) and stroke, Hyperlipidemia, Hypertension, Unspecified cerebral artery occlusion with cerebral infarction, and Valvular heart disease. Past Surgical History:  has no past surgical history on file. Social History:  reports that she has never smoked. She has never used smokeless tobacco. She reports that she does not drink alcohol or use drugs. Family History: family history includes Cancer in her mother. The patients home medications have been reviewed.     Allergies: Aspirin    -------------------------------------------------- RESULTS -------------------------------------------------  Labs:  Results for orders placed or performed during the hospital encounter of 06/07/19   Troponin   Result Value Ref Range    Troponin <0.01 0.00 - 0.03 ng/mL   CBC Auto Differential   Result Value Ref Range    WBC 12.4 (H) 4.5 - 11.5 E9/L    RBC 4.66 3.50 - 5.50 E12/L    Hemoglobin 15.1 11.5 - 15.5 g/dL    Hematocrit 46.1 34.0 - 48.0 %    MCV 98.9 80.0 - 99.9 fL    MCH 32.4 26.0 - 35.0 pg    MCHC 32.8 32.0 - 34.5 %    RDW 13.0 11.5 - 15.0 fL    Platelets 338 327 - 737 E9/L    MPV 10.6 7.0 - 12.0 fL    Neutrophils % 85.1 (H) 43.0 - EKG 12 Lead   Result Value Ref Range    Ventricular Rate 95 BPM    Atrial Rate 96 BPM    QRS Duration 72 ms    Q-T Interval 320 ms    QTc Calculation (Bazett) 402 ms    R Axis 0 degrees    T Axis -176 degrees       Radiology:  CT ABDOMEN PELVIS W IV CONTRAST Additional Contrast? None   Final Result      1. Severe diverticulosis without evidence of acute diverticulitis. 2. Gas in fluid-filled mildly distended loops of small bowel may   represent nonspecific enteritis. Notable inflamed loop of small bowel   is seen within the pelvis. 3. Small nonspecific free fluid collection present within the pelvis.          ------------------------- NURSING NOTES AND VITALS REVIEWED ---------------------------  Date / Time Roomed:  6/7/2019  7:50 PM  ED Bed Assignment:  03/03    The nursing notes within the ED encounter and vital signs as below have been reviewed. BP (!) 180/87   Pulse 87   Temp 98.2 °F (36.8 °C)   Resp 16   Ht 4' 11\" (1.499 m)   Wt 105 lb (47.6 kg)   SpO2 94%   BMI 21.21 kg/m²   Oxygen Saturation Interpretation: Normal      ------------------------------------------ PROGRESS NOTES ------------------------------------------  I have spoken with the patient and discussed todays results, in addition to providing specific details for the plan of care and counseling regarding the diagnosis and prognosis. Their questions are answered at this time and they are agreeable with the plan. I discussed at length with them reasons for immediate return here for re evaluation. They will followup with primary care by calling their office tomorrow. --------------------------------- ADDITIONAL PROVIDER NOTES ---------------------------------  At this time the patient is without objective evidence of an acute process requiring hospitalization or inpatient management. They have remained hemodynamically stable throughout their entire ED visit and are stable for discharge with outpatient follow-up.      The plan has been discussed in detail and they are aware of the specific conditions for emergent return, as well as the importance of follow-up. Discharge Medication List as of 6/7/2019 11:55 PM      START taking these medications    Details   sucralfate (CARAFATE) 1 GM tablet Take 1 tablet by mouth 4 times daily, Disp-60 tablet, R-3Print      ondansetron (ZOFRAN ODT) 4 MG disintegrating tablet Take 1 tablet by mouth every 8 hours as needed for Nausea or Vomiting, Disp-12 tablet, R-0Print             Diagnosis:  1. Generalized abdominal pain        Disposition:  Patient's disposition: Discharge to home  Patient's condition is stable.             Frederick Eaton DO  Resident  06/08/19 1803

## 2019-06-09 NOTE — ED PROVIDER NOTES
HPI:  6/9/19,   Time: 11:11 AM       Theresa Blank is a 80 y.o. female presenting to the ED for Abdominal pain and nausea for 3 days. Recently seen in the [facility] ED  2 days ago for the same complaint and diagnosed with enteritis for which she was sent home with pain control and carafate and Zofran. Pain today is a 3/10, described as sharp and achy, localized to the epigastric region. Nothing makes it better or worse Last BM was yesterday. Denies diarrhea, constipation, fever, chills, CP, SOB, or  urinary sx. Pt lives at home alone and has home health care and meal delivery. Daughter is at bedside and states that they are trying to transition her to an assisted living. Review of Systems:   Pertinent positives and negatives are stated within HPI, all other systems reviewed and are negative.          --------------------------------------------- PAST HISTORY ---------------------------------------------  Past Medical History:  has a past medical history of AS (ankylosing spondylitis) ([facility]), [facility] fibrillation [facility] H/O TIA (transient ischemic attack) and stroke, Hyperlipidemia, Hypertension, Unspecified cerebral artery occlusion with cerebral infarction, and Valvular heart disease. Past Surgical History:  has no past surgical history on file. Social History:  reports that she has never smoked. She has never used smokeless tobacco. She reports that she does not drink alcohol or use drugs. Family History: family history includes Cancer in her mother. The patients home medications have been reviewed.     Allergies: Aspirin        ---------------------------------------------------PHYSICAL EXAM--------------------------------------    Constitutional/General: Alert and oriented x3, well appearing, non toxic in NAD  Head: Normocephalic and atraumatic  Eyes: PERRL, EOMI, conjunctive normal, sclera non icteric  Mouth: Oropharynx clear, handling secretions, no trismus, no asymmetry of the posterior oropharynx or uvular edema  Neck: Supple, full ROM, non tender to palpation in the midline, no stridor, no crepitus, no meningeal signs  Respiratory: Lungs clear to auscultation bilaterally, no wheezes, rales, or rhonchi. Not in respiratory distress  Cardiovascular:  Regular rate. Regular rhythm. No murmurs, gallops, or rubs. 2+ distal pulses  Chest: No chest wall tenderness  GI:  Abdomen Soft, non-tender, Non distended. +BS. No organomegaly, no palpable masses,  No rebound, guarding, or rigidity. Musculoskeletal: Moves all extremities x 4. Warm and well perfused, no clubbing, cyanosis, or edema. Capillary refill <3 seconds  Integument: skin warm and dry. No rashes. Lymphatic: no lymphadenopathy noted  Neurologic: GCS 15, no focal deficits, symmetric strength 5/5 in the upper and lower extremities bilaterally  Psychiatric: Normal Affect    -------------------------------------------------- RESULTS -------------------------------------------------  I have personally reviewed all laboratory and imaging results for this patient. Results are listed below.      LABS:  Results for orders placed or performed during the hospital encounter of 06/09/19   CBC Auto Differential   Result Value Ref Range    WBC 10.4 4.5 - 11.5 E9/L    RBC 4.60 3.50 - 5.50 E12/L    Hemoglobin 14.9 11.5 - 15.5 g/dL    Hematocrit 44.6 34.0 - 48.0 %    MCV 97.0 80.0 - 99.9 fL    MCH 32.4 26.0 - 35.0 pg    MCHC 33.4 32.0 - 34.5 %    RDW 12.7 11.5 - 15.0 fL    Platelets 443 509 - 938 E9/L    MPV 10.3 7.0 - 12.0 fL    Neutrophils % 89.0 (H) 43.0 - 80.0 %    Immature Granulocytes % 0.5 0.0 - 5.0 %    Lymphocytes % 4.5 (L) 20.0 - 42.0 %    Monocytes % 5.8 2.0 - 12.0 %    Eosinophils % 0.1 0.0 - 6.0 %    Basophils % 0.1 0.0 - 2.0 %    Neutrophils # 9.29 (H) 1.80 - 7.30 E9/L    Immature Granulocytes # 0.05 E9/L    Lymphocytes # 0.47 (L) 1.50 - 4.00 E9/L    Monocytes # 0.60 0.10 - 0.95 E9/L    Eosinophils # 0.01 (L) 0.05 - 0.50 E9/L    Basophils # 0.01 0.00 - 0.20 E9/L    Polychromasia 1+     Poikilocytes 1+     Diane Cells 1+     Ovalocytes 1+    Basic Metabolic Panel   Result Value Ref Range    Sodium 131 (L) 132 - 146 mmol/L    Potassium 4.7 3.5 - 5.0 mmol/L    Chloride 93 (L) 98 - 107 mmol/L    CO2 22 22 - 29 mmol/L    Anion Gap 16 7 - 16 mmol/L    Glucose 134 (H) 74 - 99 mg/dL    BUN 22 8 - 23 mg/dL    CREATININE 0.7 0.5 - 1.0 mg/dL    GFR Non-African American >60 >=60 mL/min/1.73    GFR African American >60     Calcium 9.4 8.6 - 10.2 mg/dL   Lipase   Result Value Ref Range    Lipase 17 13 - 60 U/L   Lactic Acid, Plasma   Result Value Ref Range    Lactic Acid 3.0 (H) 0.5 - 2.2 mmol/L   Protime-INR   Result Value Ref Range    Protime 18.9 (H) 9.3 - 12.4 sec    INR 1.7    Digoxin level   Result Value Ref Range    Digoxin Lvl 0.5 (L) 0.8 - 2.0 ng/mL   Magnesium   Result Value Ref Range    Magnesium 1.7 1.6 - 2.6 mg/dL   Troponin   Result Value Ref Range    Troponin <0.01 0.00 - 0.03 ng/mL   Lactic Acid, Plasma   Result Value Ref Range    Lactic Acid 1.8 0.5 - 2.2 mmol/L   EKG 12 Lead   Result Value Ref Range    Ventricular Rate 123 BPM    Atrial Rate 100 BPM    QRS Duration 76 ms    Q-T Interval 306 ms    QTc Calculation (Bazett) 438 ms    R Axis 84 degrees    T Axis -92 degrees       RADIOLOGY:  Interpreted by Radiologist.  XR CHEST PORTABLE   Final Result   Cardiomegaly   Tortuous aorta   There are patchy infiltrates seen throughout both the lung fields. Pulmonary vascular congestion could have this appearance                         EKG:  This EKG is signed and interpreted by the EP. Time:   Rate: 123  Rhythm: Atrial fibrillation and RVR        ------------------------- NURSING NOTES AND VITALS REVIEWED ---------------------------   The nursing notes within the ED encounter and vital signs as below have been reviewed by [email or web address].   BP (!) 150/75   Pulse 99   Temp 97.1 °F (36.2 °C) (Temporal)   Resp 20   Ht 4' 11\" diagnosis and prognosis. Questions are answered at this time and they are agreeable with the plan.       --------------------------------- IMPRESSION AND DISPOSITION ---------------------------------    IMPRESSION  1. Abdominal pain, epigastric         DISPOSITION  Disposition: Discharge to home  Patient condition is stable    NOTE: This report was transcribed using voice recognition software.  Every effort was made to ensure accuracy; however, inadvertent computerized transcription errors may be present            Leonela Welsh MD  Resident  06/09/19 [address] Hidalgo MD  Resident  06/09/19 Satinder Bro MD  06/13/19 8720

## 2019-06-10 PROBLEM — K56.609 SMALL BOWEL OBSTRUCTION (HCC): Status: ACTIVE | Noted: 2019-01-01

## 2019-06-10 NOTE — PROGRESS NOTES
Flores Rene was ordered Tumeric Curcumin 500mg capsule. As per the [address] [place] [address], herbals and certain dietary supplements will be discontinued.   The herbal or dietary supplement may be continued after discharge from the hospital.  Thank you

## 2019-06-10 NOTE — PLAN OF CARE
Problem: Pain:  Goal: Control of acute pain  Description  Control of acute pain  Outcome: Met This Shift     Problem: Falls - Risk of:  Goal: Will remain free from falls  Description  Will remain free from falls  Outcome: Met This Shift     Problem: Cardiac:  Goal: Ability to maintain an adequate cardiac output will improve  Description  Ability to maintain an adequate cardiac output will improve  Outcome: Met This Shift     Problem:  Bowel/Gastric:  Goal: Ability to achieve a regular elimination pattern will improve  Description  Ability to achieve a regular elimination pattern will improve  Outcome: Ongoing

## 2019-06-11 NOTE — PROGRESS NOTES
Physical Therapy    Facility/Department: [facility] SURGERY  Initial Assessment    NAME: Rogelio Altamirano  : 1929  MRN: [id number]    Date of Service: 2019       REQUIRES PT FOLLOW UP: Yes       Patient Diagnosis(es): There were no encounter diagnoses. has a past medical history of AS (ankylosing spondylitis) ([facility]), Atrial fibrillation ([facility] CHF (congestive heart failure) ([facility] H/O TIA (transient ischemic attack) and stroke, Hyperlipidemia, Hypertension, Unspecified cerebral artery occlusion with cerebral infarction, and Valvular heart disease. has a past surgical history that includes Hysterectomy. Evaluating Therapist: Sarah Beth Monzon PT       Room #:  952   DIAGNOSIS: abd pain   PRECAUTIONS: falls     Social:  Pt lives alone  in a  1  floor plan    Prior to admission pt walked with rollator     Initial Evaluation  Date:  19 Treatment      Short Term/ Long Term   Goals   Was pt agreeable to Eval/treatment? yes      Does pt have pain?  abd pain , minimal      Bed Mobility  Rolling:  SBA   Supine to sit:  SBA   Sit to supine:  SBA    Scooting:  SBA   independent    Transfers Sit to stand:  SBA   Stand to sit:  SBA   Stand pivot:  SBA    independent    Ambulation     100 feet with ww  with  SBA    200 feet with  ww  with  Independent        Stair negotiation: ascended and descended NT    4  steps with  1 rail with  S/SBA   LE ROM  WFL      LE strength  4/5      AM- PAC RAW score   18/24            Pt is alert and Oriented x  3      Balance:  SBA  Endurance: decreased, pt reports weakness      ASSESSMENT  Pt displays functional ability as noted in the objective portion of this evaluation.       Comments/Treatment:  Pt RTB after mobility per her request.        Examination of body systems Decreased   Functional mobility x   ROM    Strength    Safety Awareness    Cognition    Endurance    Sensation    Balance    Vision/Visual Deficits    Coordination        Patient education  Pt educated on fall risk     Patient response to education:   Pt verbalized understanding Pt demonstrated skill Pt requires further education in this area   Yes   X      Rehab potential is Good for reaching above PT goals. Pts/ family goals   1. None stated     Patient and or family understand(s) diagnosis, prognosis, and plan of care. -  Yes     PLAN  PT care will be provided in accordance with the objectives noted above. Whenever appropriate, clear delegation orders will be provided for nursing staff. Exercises and functional mobility practice will be used as well as appropriate assistive devices or modalities to obtain goals. Patient and family education will also be administered as needed. Frequency of treatments will be 2-3 x/week x 5 days.     Time in: 1350   Time out:  LEAH Carrillo number:  PT [id number]

## 2019-06-11 NOTE — PROGRESS NOTES
Occupational Therapy  OCCUPATIONAL THERAPY INITIAL EVALUATION      Date:2019  Patient Name: Rogelio Altamirano  MRN: [id number]  : 1929  Room: [address] [place], [place] [address]    Evaluating OT: Enzo Pate OTR/L [id number]    AM-PAC Daily Activity Raw Score:     Recommended Adaptive Equipment: TBD    Diagnosis: SBO. Pt presented to ED with abdominal pain from home      Pertinent Medical History: ankylosing spondylitis, CHF, afib, HTN, CVA   Precautions:  falls     Home Living: Pt lives alone in a single story home with 2 steps HR on entry. Laundry basement. Bathroom setup: tub/shower combo seat and grab bars     Prior Level of Function: Mod I with ADLs, paid caregiver assist 2x/wk 3 hr increments with IADLs; completed functional mobility with [employer]  Driving: No    Pain Level: abdominal pain    Cognition: A&O: 4/4. Problem solving:  WFL   Judgement/safety:  WFL     Functional Assessment:   Initial Eval Status  Date: 19 Treatment session:  Short Term Goals  Treatment frequency: 2-5x/wk PRN x1-3 wks   Feeding Independent     Grooming Set up  Independent   UB Dressing Set up  Independent   LB Dressing Min A  Mod I    Bathing Min A  Mod I   Toileting SBA  Mod I   Bed Mobility  Supine to sit: SBA     Functional Transfers STS: SBA  Mod I   Functional Mobility SBA with [employer]  Household distance  Mod I during ADLs   Balance Sitting: fair    Standing: fair at [employer]     Activity Tolerance Fair. Limited by abdominal pain  standing yanet x6-7 min with fair plus balance during self care tasks           ADL comments: Pt is limited in completion of self care tasks due to weakness, fatigue and abdominal pain.      Strength ROM Additional Info:    RUE  4-/5 WFL good  and FMC/dexterity noted during ADL tasks     LUE 4-/5 WFL good  and FMC/dexterity noted during ADL tasks         Hearing: WFL   Vision: wandering eye, WFL in immediate environment  Sensation:  No c/o numbness or tingling   Tone: WFL   Edema: none Comments/Treatment: Patient educated on adapted techniques for completion of ADL, safe functional transfers and mobility. Patient required cues for follow through with proper hand/foot placement, pacing, safety, and technique in bed mobility, functional transfers, functional mobility, and LB dressing in preparation for maximum independence in all self care tasks. Eval Complexity: Low    Assessment of current deficits   Functional mobility [x]  ADLs [x] Strength [x]  Cognition []  Functional transfers  [x] IADLs [x] Safety Awareness [x]  Endurance [x]  Fine Motor Coordination [] Balance [x] Vision/perception [] Sensation []   Gross Motor Coordination [] ROM [] Delirium []                  Motor Control []    Plan of Care:   ADL retraining [x]   Equipment needs [x]   Neuromuscular re-education [x] Energy Conservation Techniques [x]  Functional Transfer training [x] Patient and/or Family Education [x]  Functional Mobility training [x]  Environmental Modifications [x]  Cognitive re-training []   Compensatory techniques for ADLs [x]  Splinting Needs []   Positioning to improve overall function [x]   Therapeutic Activity [x]  Therapeutic Exercise  [x]  Visual/Perceptual: []    Delirium prevention/treatment  []   Other:  []    Rehab Potential: Good for established goals     Patient / Family Goal: To get home. Patient and/or family were instructed on functional diagnosis, prognosis/goals and OT plan of care. Pt verbalized good understanding. Upon arrival, patient supine in bed. At end of session, patient supine in bed per pt request with call light and phone within reach, all lines and tubes intact. Pt would benefit from continued skilled OT to increase safety and independence with completion of ADL/IADL tasks for functional independence and quality of life. Bed/chair alarm: ON.     Low Evaluation + 0 timed treatment minutes    Evaluation time includes thorough review of current medical information,

## 2019-06-11 NOTE — H&P
Department of Internal Medicine  Dr. Claudia Calderon History and Physical      CHIEF COMPLAINT:  Abdominal pain  Reason for Admission:  Partial small bowel obstruction    HISTORY OF PRESENT ILLNESS:      The patient is a 80 y.o. female with  who presents with the above problems. she has gone to the ER twice with this complaint. She has generalized abdominal pain, with an achiness and occasional sharp pain in the lower abdominal quadrants. She is basically constipated. It's been 3 days since her last bowel movement. She did throw up. She has not had any hematemesis. There's been no melena. No hematochezia. She is having trouble passing flatus. She was dehydrated in the emergency room. Her lactic acid was slightly elevated. She is very achy all over and she in general and weak. She followed up with her PCP yesterday, still complaining of abdominal pain and he directly admitted her to the hospital.  [facility] did have some emesis yesterday.     Past Medical History:        Diagnosis Date    AS (ankylosing spondylitis) ([facility])     severe    Atrial fibrillation (HCC)     CHF (congestive heart failure) ([facility])     H/O TIA (transient ischemic attack) and stroke     Hyperlipidemia     Hypertension     Unspecified cerebral artery occlusion with cerebral infarction     Valvular heart disease      Past Surgical History:        Procedure Laterality Date    HYSTERECTOMY         Medications Prior to Admission:    Medications Prior to Admission: sucralfate (CARAFATE) 1 GM tablet, Take 1 tablet by mouth 4 times daily  ondansetron (ZOFRAN ODT) 4 MG disintegrating tablet, Take 1 tablet by mouth every 8 hours as needed for Nausea or Vomiting  metoprolol tartrate (LOPRESSOR) 50 MG tablet, Take 2 tablets 2 times daily  simvastatin (ZOCOR) 40 MG tablet, Take 1 tablet by mouth nightly  lisinopril (PRINIVIL;ZESTRIL) 20 MG tablet, TAKE ONE TABLET BY MOUTH TWO TIMES A DAY  warfarin (JANTOVEN) 5 MG tablet, TAKE ONE TABLET BY MOUTH DAILY  digoxin (LANOXIN) 125 MCG tablet, Take 1 tablet by mouth daily  Cholecalciferol (VITAMIN D) 2000 UNITS CAPS capsule, Take 2,000 Units by mouth daily. therapeutic multivitamin-minerals (THERAGRAN-M) tablet, Take 1 tablet by mouth daily. Ascorbic Acid (VITAMIN C) 500 MG tablet, Take 500 mg by mouth daily. calcium carbonate (OSCAL) 500 MG TABS tablet, Take 500 mg by mouth daily. Turmeric Curcumin 500 MG CAPS, Take 500 mg by mouth daily  Zinc 50 MG CAPS,  Take by mouth daily     Allergies:  Aspirin    Social History:   TOBACCO:   reports that she has never smoked. She has never used smokeless tobacco.    Family History:       Problem Relation Age of Onset    Cancer Mother         stomach     REVIEW OF SYSTEMS:  CONSTITUTIONAL:  positive for  fatigue, malaise and anorexia  EYES:  negative  HEENT:  negative  RESPIRATORY:  negative  CARDIOVASCULAR:  negative  GASTROINTESTINAL:  positive for nausea, vomiting, change in bowel habits, constipation, abdominal pain and abdominal distention  GENITOURINARY:  negative  INTEGUMENT/BREAST:  negative  HEMATOLOGIC/LYMPHATIC:  negative  ALLERGIC/IMMUNOLOGIC:  negative  ENDOCRINE:  negative  MUSCULOSKELETAL:  positive for  myalgias, arthralgias, pain, stiff joints and muscle weakness  NEUROLOGICAL:  negative  PHYSICAL EXAM:    Vitals:  BP (!) 168/88   Pulse 98   Temp 97.8 °F (36.6 °C) (Oral)   Resp 16   Ht 4' 11\" (1.499 m)   Wt 101 lb 4.8 oz (45.9 kg)   SpO2 94%   BMI 20.46 kg/m²     CONSTITUTIONAL:  awake, alert, cooperative, no apparent distress, and appears stated age  EYES:  Lids and lashes normal, pupils equal, round and reactive to light, extra ocular muscles intact, sclera clear, conjunctiva normal  ENT:  Normocephalic, without obvious abnormality, atraumatic, sinuses nontender on palpation, external ears without lesions, oral pharynx with moist mucus membranes, tonsils without erythema or exudates, gums normal and good dentition.   NECK:  Supple, symmetrical, trachea midline, no adenopathy, thyroid symmetric, not enlarged and no tenderness, skin normal  HEMATOLOGIC/LYMPHATICS:  no cervical lymphadenopathy  BACK:  Symmetric, no curvature, spinous processes are non-tender on palpation, paraspinous muscles are non-tender on palpation, no costal vertebral tenderness  LUNGS:  No increased work of breathing, good air exchange, clear to auscultation bilaterally, no crackles or wheezing  CARDIOVASCULAR:  Regular rate and rhythm. Murmur noted  ABDOMEN:  soft  Slight distention. Decreased bowel sounds  MUSCULOSKELETAL:  There is no redness, warmth, or swelling of the joints. Full range of motion noted. Motor strength is 5 out of 5 all extremities bilaterally. Tone is normal.  NEUROLOGIC:  Awake, alert, oriented to name, place and time. Cranial nerves II-XII are grossly intact. Motor is 5 out of 5 bilaterally. Cerebellar finger to nose, heel to shin intact. Sensory is intact.   Babinski down going, Romberg negative, and gait is normal.  SKIN:  no bruising or bleeding    DATA:  CBC with Differential:    Lab Results   Component Value Date    WBC 9.4 06/10/2019    RBC 4.59 06/10/2019    HGB 15.1 06/10/2019    HCT 43.9 06/10/2019     06/10/2019    MCV 95.6 06/10/2019    MCH 32.9 06/10/2019    MCHC 34.4 06/10/2019    RDW 12.6 06/10/2019    LYMPHOPCT 4.5 06/09/2019    MONOPCT 5.8 06/09/2019    BASOPCT 0.1 06/09/2019    MONOSABS 0.60 06/09/2019    LYMPHSABS 0.47 06/09/2019    EOSABS 0.01 06/09/2019    BASOSABS 0.01 06/09/2019     CMP:    Lab Results   Component Value Date     06/11/2019    K 4.0 06/11/2019    CL 93 06/11/2019    CO2 22 06/11/2019    BUN 23 06/11/2019    CREATININE 0.7 06/11/2019    GFRAA >60 06/11/2019    LABGLOM >60 06/11/2019    GLUCOSE 79 06/11/2019    PROT 6.3 06/10/2019    LABALBU 3.7 06/10/2019    CALCIUM 8.5 06/11/2019    BILITOT 1.1 06/10/2019    ALKPHOS 69 06/10/2019    AST 30 06/10/2019    ALT 15 06/10/2019     Partial small bowel obstruction with  moderately dilated proximal   small bowel loops and stomach and collapsed distal small bowel loops.       Diverticulosis of the left hemicolon with thickening of sigmoid colon   which may be due to spasm or mild uncomplicated diverticulitis.       Moderate patchy infiltrates and pleural effusions in lung bases likely   CHF. There is also moderate amount of ascites.       A cystic lesion the body of the pancreas. Surveillance recommended.           ASSESSMENT AND PLAN:      partial small bowel obstruction  Hyponatremia  Chronic A.  Fib on anticoagulation  Rule out CHF  Surgical consult  Keep nothing by mouth  We'll workup hyponatremia

## 2019-06-11 NOTE — PLAN OF CARE
Problem: Pain:  Goal: Pain level will decrease  Description  Pain level will decrease  Outcome: Met This Shift     Problem: Falls - Risk of:  Goal: Absence of physical injury  Description  Absence of physical injury  Outcome: Met This Shift     Problem: Cardiac:  Goal: Complications related to the disease process, condition or treatment will be avoided or minimized  Description  Complications related to the disease process, condition or treatment will be avoided or minimized  Outcome: Met This Shift

## 2019-06-11 NOTE — PLAN OF CARE
Problem: Pain:  Goal: Control of acute pain  Description  Control of acute pain  6/10/2019 2246 by Vicente Townsend RN  Outcome: Met This Shift  6/10/2019 1642 by Ke Ayala RN  Outcome: Met This Shift     Problem: Falls - Risk of:  Goal: Will remain free from falls  Description  Will remain free from falls  6/10/2019 2246 by Vicente Townsend RN  Outcome: Met This Shift  6/10/2019 1642 by Ke Ayala RN  Outcome: Met This Shift     Problem: Bowel/Gastric:  Goal: Ability to achieve a regular elimination pattern will improve  Description  Ability to achieve a regular elimination pattern will improve  6/10/2019 2246 by Vicente Townsend RN  Outcome: Met This Shift  6/10/2019 1642 by Ke Ayala RN  Outcome: Ongoing     Problem: Skin Integrity:  Goal: Risk for impaired skin integrity will decrease  Description  Risk for impaired skin integrity will decrease  Outcome: Met This Shift     Problem:  Activity:  Goal: Ability to tolerate increased activity will improve  Description  Ability to tolerate increased activity will improve  Outcome: Met This Shift     Problem: Cardiac:  Goal: Ability to maintain an adequate cardiac output will improve  Description  Ability to maintain an adequate cardiac output will improve  6/10/2019 2246 by Vicente Townsend RN  Outcome: Met This Shift  6/10/2019 1642 by Ke Ayala RN  Outcome: Met This Shift     Problem: Safety:  Goal: Ability to remain free from injury will improve  Description  Ability to remain free from injury will improve  Outcome: Met This Shift

## 2019-06-11 NOTE — PROGRESS NOTES
Physical Therapy  PT orders received. Pt reports she does not have PT needs.  Will discharge order per pt request.

## 2019-06-11 NOTE — CONSULTS
Cholecalciferol (VITAMIN D) 2000 UNITS CAPS capsule Take 2,000 Units by mouth daily. Yes Historical Provider, MD   therapeutic multivitamin-minerals (THERAGRAN-M) tablet Take 1 tablet by mouth daily. Yes Historical Provider, MD   Ascorbic Acid (VITAMIN C) 500 MG tablet Take 500 mg by mouth daily. Yes Historical Provider, MD   calcium carbonate (OSCAL) 500 MG TABS tablet Take 500 mg by mouth daily. Yes Historical Provider, MD   Turmeric Curcumin 500 MG CAPS Take 500 mg by mouth daily    Historical Provider, MD   Zinc 50 MG CAPS   Take by mouth daily     Historical Provider, MD       Allergies   Allergen Reactions    Aspirin Other (See Comments)     Upset stomach       Family History   Problem Relation Age of Onset    Cancer Mother         stomach       Social History     Tobacco Use    Smoking status: Never Smoker    Smokeless tobacco: Never Used   Substance Use Topics    Alcohol use: No    Drug use: No         Review of Systems   General ROS: positive for  - malaise  Hematological and Lymphatic ROS: negative  Respiratory ROS: no cough, shortness of breath, or wheezing  Cardiovascular ROS: no chest pain or dyspnea on exertion  Gastrointestinal ROS: positive for - abdominal pain, appetite loss, change in bowel habits, gas/bloating and nausea/vomiting  Genito-Urinary ROS: no dysuria, trouble voiding, or hematuria  Musculoskeletal ROS: negative      PHYSICAL EXAM:    Vitals:    06/10/19 2117   BP: 134/88   Pulse: 112   Resp:    Temp:    SpO2:        General Appearance:  awake, alert, oriented, in no acute distress  Skin:  Skin color, texture, turgor normal. No rashes or lesions. Head/face:  NCAT  Eyes:  No gross abnormalities. Lungs:  Normal expansion. Clear to auscultation. No rales, rhonchi, or wheezing. Heart:  Heart regular rate and rhythm  Abdomen:  Soft, distended, NT, tympanic throughout   Extremities: Extremities warm to touch, pink, with no edema.       LABS:    CBC  Recent Labs 06/10/19  1613   WBC 9.4   HGB 15.1   HCT 43.9        BMP  Recent Labs     06/10/19  1613   *   K 4.7   CL 92*   CO2 22   BUN 24*   CREATININE 0.7   CALCIUM 9.0     Liver Function  Recent Labs     19  1202 06/10/19  1613   LIPASE 17  --    BILITOT  --  1.1   AST  --  30   ALT  --  15   ALKPHOS  --  69   PROT  --  6.3*   LABALBU  --  3.7       Recent Labs     19  1202   INR 1.7       RADIOLOGY    Xr Abdomen (kub) (single Ap View)    Result Date: 6/10/2019  Patient MRN: [id number] : 1929 Age:  80 years Gender: Female Order Date: 6/10/2019 4:00 PM Exam: XR ABDOMEN (KUB) (SINGLE AP VIEW) Number of Images: 2 views Indication:   ileus ileus Comparison: None. Findings: The bowel gas pattern is normal. There is mild fecal stasis seen. No evidence of organomegaly or abnormal calcifications seen. The osseous structures of the abdomen and pelvis demonstrate osteopenia. . The visualized portion of the chest demonstrate cardiomegaly. There is small bilateral pleural effusion left greater than the right. NON-SPECIFIC GAS PATTERN. ASSESSMENT:  80 y.o. female with ileus vs pSBO    PLAN:  NPO  IVF  Bowel regimen  Monitor for bowel movements  SBFT  D/w Dr. Deborah Halsted    Electronically signed by Jackie Tapia DO on 19 at 5:08 AM    SBO  Abdominal pain   No tenderness.   Agree with above    Jolynn Collins MD

## 2019-06-12 PROBLEM — I50.33 ACUTE ON CHRONIC DIASTOLIC CONGESTIVE HEART FAILURE (HCC): Status: ACTIVE | Noted: 2019-01-01

## 2019-06-12 PROBLEM — E87.1 HYPONATREMIA: Status: ACTIVE | Noted: 2019-01-01

## 2019-06-12 NOTE — DISCHARGE INSTR - COC
Continuity of Care Form    Patient Name: Katherine Aguirre   :  1929  MRN:  [id number]    Admit date:  6/10/2019  Discharge date:  6/15/19    Code Status Order: No Order   Advance Directives:   [address] [place] [address] Documentation     Date/Time Healthcare Directive Type of Healthcare Directive Copy in [address] Agent's Name Healthcare Agent's Phone Number    06/10/19   Yes, patient has an advance directive for healthcare treatment  Durable power of  for health care  No, copy requested from family  Healthcare power of   Loco Murillo  [phone]          Admitting Physician:  Silverio Molina MD  PCP: Silverio Molina MD    Discharging Nurse:  [facility] Unit/Room#: 5308/7843-K  Discharging Unit Phone Number: [phone]    Emergency Contact:   Extended Emergency Contact Information  Primary Emergency Contact: Marti Kothari  Address: [address] Phone: [phone]  Mobile Phone: [phone]  Relation: Child  [facility] Phone: [phone]  Mobile Phone: [phone]  Relation: Child    Past Surgical History:  Past Surgical History:   Procedure Laterality Date    HYSTERECTOMY         Immunization History:   Immunization History   Administered Date(s) Administered    Influenza Virus Vaccine 10/03/2014    Influenza, High Dose (Fluzone 65 yrs and older) 10/09/2015, 10/04/2016, 10/13/2017, 10/19/2018    Pneumococcal 13-valent Conjugate (Honuggb07) 10/30/2015    Pneumococcal Polysaccharide ([id number]) 2013    Td, unspecified formulation 2017       Active Problems:  Patient Active Problem List   Diagnosis Code    Valvular heart disease I38    Atrial fibrillation ([facility] [address] I48.91    Hyperlipidemia E78.5    Hypertension I10    Cerebral artery occlusion with cerebral infarction restirctions  Other Medical Equipment (for information only, NOT a DME order):  walker and hospital bed  Other Treatments:     Patient's personal belongings (please select all that are sent with patient):  Glasses    RN SIGNATURE:  Electronically signed by Cra De La Vega RN on 6/15/19 at 1:20 PM    CASE MANAGEMENT/SOCIAL WORK SECTION    Inpatient Status Date: ***    Readmission Risk Assessment Score:  Readmission Risk              Risk of Unplanned Readmission:        15           Discharging to Facility/ Agency   · Name: [facility]  · Address:  · Phone:[phone]  · Fax:    Dialysis Facility (if applicable)   · Name:  · Address:  · Dialysis Schedule:  · Phone:  · Fax:    / signature: Electronically signed by MELANIE Woodson on 6/12/2019 at 12:33 PM    PHYSICIAN SECTION    Prognosis: Good    Condition at Discharge: Stable    Rehab Potential (if transferring to Rehab): Good    Recommended Labs or Other Treatments After Discharge: check INR. Monitor BMP and blood pressure as well as BNP    Physician Certification: I certify the above information and transfer of Leif Canales  is necessary for the continuing treatment of the diagnosis listed and that she requires [facility] for less 30 days. Update Admission H&P: Changes in H&P as follows - patient admitted with partial small bowel obstruction, pulmonary edema, hyponatremia and coagulopathy. Obstruction and cleared after small bowel follow-through. Blood pressure medications ingested. Had to be given vitamin K. Lasix started. Sodium corrected with Lasix.     PHYSICIAN SIGNATURE:  Electronically signed by Mitch Garcia MD on 6/13/19 at 8:12 AM

## 2019-06-12 NOTE — PLAN OF CARE
Problem: Pain:  Goal: Pain level will decrease  Description  Pain level will decrease  6/11/2019 2150 by Ted Amezquita RN  Outcome: Met This Shift  6/11/2019 0958 by Gabby Knutson RN  Outcome: Met This Shift     Problem: Falls - Risk of:  Goal: Will remain free from falls  Description  Will remain free from falls  Outcome: Met This Shift  Goal: Absence of physical injury  Description  Absence of physical injury  6/11/2019 0958 by Gabby Knutson RN  Outcome: Met This Shift     Problem: Bowel/Gastric:  Goal: Ability to achieve a regular elimination pattern will improve  Description  Ability to achieve a regular elimination pattern will improve  Outcome: Met This Shift     Problem: Skin Integrity:  Goal: Risk for impaired skin integrity will decrease  Description  Risk for impaired skin integrity will decrease  Outcome: Met This Shift     Problem:  Activity:  Goal: Ability to tolerate increased activity will improve  Description  Ability to tolerate increased activity will improve  Outcome: Met This Shift     Problem: Cardiac:  Goal: Ability to maintain an adequate cardiac output will improve  Description  Ability to maintain an adequate cardiac output will improve  Outcome: Met This Shift  Goal: Complications related to the disease process, condition or treatment will be avoided or minimized  Description  Complications related to the disease process, condition or treatment will be avoided or minimized  6/11/2019 0958 by Gabby Knutson RN  Outcome: Met This Shift     Problem: Safety:  Goal: Ability to remain free from injury will improve  Description  Ability to remain free from injury will improve  Outcome: Met This Shift

## 2019-06-12 NOTE — PROGRESS NOTES
Occupational Therapy  OT BEDSIDE TREATMENT NOTE      Date:2019  Patient Name: Trever Fortune  MRN: [id number]  : 1929  Room: [address] [place], [place] [address]     Evaluating OT: Cynthia Anguiano OTR/L [id number]     AM-PAC Daily Activity Raw Score: 19     Recommended Adaptive Equipment: continue to assess      Diagnosis: SBO. Pt presented to ED with abdominal pain from home      Pertinent Medical History: ankylosing spondylitis, CHF, afib, HTN, CVA   Precautions:  falls     Home Living: Pt lives alone in a single story home with 2 steps HR on entry. Laundry basement. Bathroom setup: tub/shower combo seat and grab bars      Prior Level of Function: Mod I with ADLs, paid caregiver assist 2x/wk 3 hr increments with IADLs; completed functional mobility with [employer]  Driving: No     Pain Level: abdominal pain     Cognition: A&O                Functional Assessment:    Initial Eval Status  Date: 19 Treatment session:  Short Term Goals  Treatment frequency: 2-5x/wk PRN x1-3 wks   Feeding Independent       Grooming Set up [place] while standing at the sink   Independent   UB Dressing Set up   Independent   LB Dressing Min A  SBA to arrange clothing while standing Mod I    Bathing Min A   Mod I   Toileting SBA  SBA Mod I   Bed Mobility  Supine to sit: SBA  CGA supine to sit      Functional Transfers STS: SBA  SBA from bed, chair, and toilet surfaces Mod I   Functional Mobility SBA with [employer]  Household distance  SBA using w/w   Occasional assist to guide walker in room. Mod I during ADLs   Balance Sitting: fair     Standing: fair at [employer]       Activity Tolerance Fair. Limited by abdominal pain  fair+  standing yanet x6-7 min with fair plus balance during self care tasks         Comments:  Pt reports she is feeling better after having a bowel movement. Remained in chair at end of the session. Education: walker safety    Other: cues for hand placement during transfers. · Pt has made  progress towards set goals.    · Continue with current

## 2019-06-12 NOTE — PROGRESS NOTES
Pt seen/ examined. ROS x 10 neg. Except: abdomen feels better. Reviewed CAT scan with patient. Her appetite is still poor  Chart reviewed. meds reviewed. D/w nursing + family as available. EXAM:  BP (!) 174/63   Pulse 99   Temp 97.7 °F (36.5 °C) (Oral)   Resp 16   Ht 4' 11\" (1.499 m)   Wt 101 lb 4.8 oz (45.9 kg)   SpO2 93%   BMI 20.46 kg/m²   GEN: A+O NAD. HEENT:NCAT. EOMI. EMEKA   NECK:  No JVD. No bruits. Trach midline. No thyromegaly. LUNGS: CTA w/o rales, rhonchi, wheezes. Good excursion. CV: rrr w/o mrg  ABD: soft, non tender. Nl BS. No organomegaly. EXT: no clubbing, cyanosis, edema.   NEURO:   Labs/data reviewed  LABS: CBC with Differential:    Lab Results   Component Value Date    WBC 9.4 06/10/2019    RBC 4.59 06/10/2019    HGB 15.1 06/10/2019    HCT 43.9 06/10/2019     06/10/2019    MCV 95.6 06/10/2019    MCH 32.9 06/10/2019    MCHC 34.4 06/10/2019    RDW 12.6 06/10/2019    LYMPHOPCT 4.5 06/09/2019    MONOPCT 5.8 06/09/2019    BASOPCT 0.1 06/09/2019    MONOSABS 0.60 06/09/2019    LYMPHSABS 0.47 06/09/2019    EOSABS 0.01 06/09/2019    BASOSABS 0.01 06/09/2019     CMP:    Lab Results   Component Value Date     06/12/2019    K 4.2 06/12/2019    CL 97 06/12/2019    CO2 17 06/12/2019    BUN 29 06/12/2019    CREATININE 0.7 06/12/2019    GFRAA >60 06/12/2019    LABGLOM >60 06/12/2019    GLUCOSE 77 06/12/2019    PROT 6.3 06/10/2019    LABALBU 3.7 06/10/2019    CALCIUM 8.7 06/12/2019    BILITOT 1.1 06/10/2019    ALKPHOS 69 06/10/2019    AST 30 06/10/2019    ALT 15 06/10/2019     PT/INR:    Lab Results   Component Value Date    PROTIME 84.2 06/12/2019    PROTIME 24.9 06/03/2019    INR 7.6 06/12/2019       Medications:    Scheduled Meds:   vitamin C  500 mg Oral Daily    vitamin D  2,000 Units Oral Daily    digoxin  125 mcg Oral Daily    lisinopril  20 mg Oral Daily    metoprolol tartrate  50 mg Oral BID    simvastatin  40 mg Oral Nightly    sucralfate  1 g Oral 4x Daily

## 2019-06-12 NOTE — PROGRESS NOTES
Physical Therapy  Facility/Department: [address] ORTHO SURGERY  Daily Treatment Note  NAME: Norris Webster  : 1929  MRN: [id number]    Date of Service: 2019      Patient Diagnosis(es): There were no encounter diagnoses. has a past medical history of AS (ankylosing spondylitis) (HCC), Atrial fibrillation ([facility] [address] CHF (congestive heart failure) ([facility] [address] H/O TIA (transient ischemic attack) and stroke, Hyperlipidemia, Hypertension, Unspecified cerebral artery occlusion with cerebral infarction, and Valvular heart disease. has a past surgical history that includes Hysterectomy. Evaluating Therapist: Preet Tabares PT         Room #:  168   DIAGNOSIS: abd pain   PRECAUTIONS: falls      Social:  Pt lives alone  in a  1  floor plan    Prior to admission pt walked with rollator       Initial Evaluation  Date:  19 Treatment      Short Term/ Long Term   Goals   Was pt agreeable to Eval/treatment? yes   yes     Does pt have pain?  abd pain , minimal   none reported     Bed Mobility  Rolling:  SBA   Supine to sit:  SBA   Sit to supine:  SBA    Scooting:  SBA  NT  independent    Transfers Sit to stand:  SBA   Stand to sit:  SBA   Stand pivot:  SBA   SB/Supervision  independent    Ambulation     100 feet with ww  with  SBA   15 feet, 50 feet, 75 feet and 120 feet with w/w SBA  200 feet with  ww  with  Independent    Stair negotiation: ascended and descended NT   4 steps with 2 rails SBA  4  steps with  1 rail with  S/SBA   LE ROM  WFL        LE strength  4/5        AM- PAC RAW score                 Balance: standing with w/w SBA. Patient education  Pt was educated on PT objectives during treatment session, hand placement during transfers. Patient response to education:   Pt verbalized understanding Pt demonstrated skill Pt requires further education in this area   Yes    yes     Additional Comments: Pt found sitting up in chair.   Pt assisted into the restroom prior to ambulation in

## 2019-06-12 NOTE — PATIENT CARE CONFERENCE
P Quality Flow/Interdisciplinary Rounds Progress Note        Quality Flow Rounds held on June 12, 2019    Disciplines Attending:  Bedside Nurse, ,  and Nursing Unit [address] was admitted on 6/10/2019  3:11 PM    Anticipated Discharge Date:  Expected Discharge Date: 06/12/19    Disposition:    Yaniv Score:  Yaniv Scale Score: 19    Readmission Risk              Risk of Unplanned Readmission:        15           Discussed patient goal for the day, patient clinical progression, and barriers to discharge. The following Goal(s) of the Day/Commitment(s) have been identified:  Pain control. Vitamin K dose today.         Karla Tran  June 12, 2019

## 2019-06-12 NOTE — PROGRESS NOTES
GENERAL SURGERY  DAILY PROGRESS NOTE  6/12/2019    Subjective:  Feeling a little better today, pain is lessened. Had a bowel movement after her CT scan.      Objective:  BP (!) 158/88   Pulse 106   Temp 97.7 °F (36.5 °C) (Oral)   Resp 16   Ht 4' 11\" (1.499 m)   Wt 101 lb 4.8 oz (45.9 kg)   SpO2 93%   BMI 20.46 kg/m²     GENERAL:  Laying in bed, awake, alert, cooperative, no apparent distress  LUNGS:  No increased work of breathing  CARDIOVASCULAR:  RR  ABDOMEN:  Soft, non-tender, slightly distended  EXTREMITIES: No edema or swelling  SKIN: Warm and dry    Assessment/Plan:  80 y.o. female admitted with pSBO, resolving    CLD  IVF  Monitor abdominal exam  Monitor bowel movements  OOB, ambulate    Electronically signed by Mikayla Gatica DO on 6/12/2019 at 6:31 AM

## 2019-06-13 NOTE — PATIENT CARE CONFERENCE
[facility] Quality Flow/Interdisciplinary Rounds Progress Note        Quality Flow Rounds held on June 13, 2019    Disciplines Attending:  Bedside Nurse, ,  and Nursing Unit [address] was admitted on 6/10/2019  3:11 PM    Anticipated Discharge Date:  Expected Discharge Date: 06/12/19    Disposition:    Yaniv Score:  Yaniv Scale Score: 19    Readmission Risk              Risk of Unplanned Readmission:        14           Discussed patient goal for the day, patient clinical progression, and barriers to discharge. The following Goal(s) of the Day/Commitment(s) have been identified:  Pain control. DC to [facility] today.        [facility]  June 13, 2019

## 2019-06-13 NOTE — PROGRESS NOTES
Occupational Therapy  OT BEDSIDE TREATMENT NOTE      Date:2019  Patient Name: Jayy Liz  MRN: [id number]  : 1929  Room: [address] [place], [place] [address]A     Evaluating OT: Gwen Townsend OTR/L [id number]     AM-PAC Daily Activity Raw Score: 18     Recommended Adaptive Equipment: continue to assess      Diagnosis: SBO. Pt presented to ED with abdominal pain from home      Pertinent Medical History: ankylosing spondylitis, CHF, afib, HTN, CVA   Precautions:  falls     Home Living: Pt lives alone in a single story home with 2 steps HR on entry. Laundry basement. Bathroom setup: tub/shower combo seat and grab bars      Prior Level of Function: Mod I with ADLs, paid caregiver assist 2x/wk 3 hr increments with IADLs; completed functional mobility with [employer]  Driving: No     Pain Level: abdominal pain     Cognition: A&O                Functional Assessment:    Initial Eval Status  Date: 19 Treatment session:  Short Term Goals  Treatment frequency: 2-5x/wk PRN x1-3 wks   Feeding Independent SERRA to open containers      Grooming Set up   Independent   UB Dressing Set up   Independent   LB Dressing Min A  Mod I    Bathing Min A   Mod I   Toileting SBA  Mod I   Bed Mobility  Supine to sit: SBA  SBA supine to sit      Functional Transfers STS: SBA  SBA from bed and  chair Mod I   Functional Mobility SBA with [employer]  Household distance  SBA using w/w   Occasional assist to guide walker in room. Mod I during ADLs   Balance Sitting: fair     Standing: fair at [employer]       Activity Tolerance Fair. Limited by abdominal pain  fair+  standing yanet x6-7 min with fair plus balance during self care tasks         Comments: Pt very pleasant. Pt reports she is feeling better. Remained in chair at end of the session. Education: walker safety    Other: cues for hand placement during transfers. · Pt has made  progress towards set goals.    · Continue with current plan of care      Total Tx Time: [address] [place] [address]

## 2019-06-13 NOTE — PROGRESS NOTES
Physical Therapy  Facility/Department: [address] ORTHO SURGERY  Daily Treatment Note  NAME: Darwin Greer  : 1929  MRN: [id number]    Date of Service: 2019     Evaluating Therapist: Mitzi Aguilar PT         Room #:  715   DIAGNOSIS: abd pain   [address]     Social:  Pt lives alone  in a  1  floor plan    Prior to admission pt walked with rollator       Initial Evaluation  Date:  19 Treatment      Short Term/ Long Term   Goals   Was pt agreeable to Eval/treatment?  yes   yes     Does pt have pain?  abd pain , minimal   none reported     Bed Mobility  Rolling:  SBA   Supine to sit:  SBA   Sit to supine:  SBA    Scooting:  SBA  SBA  independent    Transfers Sit to stand:  SBA   Stand to sit:  SBA   Stand pivot:  SBA   SB/Supervision  independent    Ambulation     100 feet with ww  with  SBA   75 feet x 2 with w/w SB/Supervervision  200 feet with  ww  with  Independent    Stair negotiation: ascended and descended NT   4 steps with 2 rails SBA  4  steps with  1 rail with  S/SBA   LE ROM  [facility]       LE strength  4/5        AM- PAC RAW score             Balance: sitting EOB independent, standing with w/w SBA. Patient education  Pt was educated on PT objectives during treatment session, hand placement during transfers. Patient response to education:   Pt verbalized understanding Pt demonstrated skill Pt requires further education in this area   Yes    yes     Additional Comments: Pt found sitting up in chair. No LOB during ambulation. No difficulty with stair negotiation. Pt required seated rest break due to fatigue. Pt was left in bed with call light left by patient. Chair/bed alarm: no    Time in: 0937  Time out: 0947    Pt is making good progress toward established Physical Therapy goals. Continue with physical therapy current plan of care.     Be Godoy, PT  License Number: [id number]

## 2019-06-13 NOTE — PROGRESS NOTES
Pt seen/ examined. ROS x 10 neg. Except: abdomen feels better. Reviewed CAT scan with patient. Her appetite is still poor  Chart reviewed. meds reviewed. D/w nursing + family as available. EXAM:  BP (!) 177/90   Pulse 91   Temp 97.8 °F (36.6 °C) (Oral)   Resp 16   Ht 4' 11\" (1.499 m)   Wt 101 lb 4.8 oz (45.9 kg)   SpO2 95%   BMI 20.46 kg/m²   GEN: A+O NAD. HEENT:NCAT. EOMI. EMEKA   NECK:  No JVD. No bruits. Trach midline. No thyromegaly. LUNGS: CTA w/o rales, rhonchi, wheezes. Good excursion. CV: rrr w/o mrg  ABD: soft, non tender. Nl BS. No organomegaly. EXT: no clubbing, cyanosis, edema.   NEURO:   Labs/data reviewed  LABS: CBC with Differential:    Lab Results   Component Value Date    WBC 9.4 06/10/2019    RBC 4.59 06/10/2019    HGB 15.1 06/10/2019    HCT 43.9 06/10/2019     06/10/2019    MCV 95.6 06/10/2019    MCH 32.9 06/10/2019    MCHC 34.4 06/10/2019    RDW 12.6 06/10/2019    LYMPHOPCT 4.5 06/09/2019    MONOPCT 5.8 06/09/2019    BASOPCT 0.1 06/09/2019    MONOSABS 0.60 06/09/2019    LYMPHSABS 0.47 06/09/2019    EOSABS 0.01 06/09/2019    BASOSABS 0.01 06/09/2019     CMP:    Lab Results   Component Value Date     06/13/2019    K 3.4 06/13/2019    CL 99 06/13/2019    CO2 23 06/13/2019    BUN 21 06/13/2019    CREATININE 0.8 06/13/2019    GFRAA >60 06/13/2019    LABGLOM >60 06/13/2019    GLUCOSE 83 06/13/2019    PROT 6.3 06/10/2019    LABALBU 3.7 06/10/2019    CALCIUM 8.2 06/13/2019    BILITOT 1.1 06/10/2019    ALKPHOS 69 06/10/2019    AST 30 06/10/2019    ALT 15 06/10/2019     PT/INR:    Lab Results   Component Value Date    PROTIME 12.6 06/13/2019    PROTIME 24.9 06/03/2019    INR 1.1 06/13/2019       Medications:    Scheduled Meds:   amLODIPine  5 mg Oral Daily    vitamin C  500 mg Oral Daily    vitamin D  2,000 Units Oral Daily    digoxin  125 mcg Oral Daily    lisinopril  20 mg Oral Daily    metoprolol tartrate  50 mg Oral BID    simvastatin  40 mg Oral Nightly    sucralfate 1 g Oral 4x Daily       Continuous Infusions:    PRN Meds:sodium chloride flush, ondansetron    A/P:      Patient Active Problem List   Diagnosis    Valvular heart disease    Atrial fibrillation ([address]    Hyperlipidemia    Hypertension    Cerebral artery occlusion with cerebral infarction ([address]    Hypokalemia    Long term current use of anticoagulant therapy    Encounter for therapeutic drug monitoring    Cerebral artery occlusion with cerebral infarction ([address]    Small bowel obstruction (HCC)    Hyponatremia    Acute on chronic diastolic congestive heart failure (HCC)    chest x-ray noted-pulmonary edema  Labs noted-hyponatremia    PLAN:dc if ok c surg

## 2019-06-14 NOTE — PROGRESS NOTES
Pt seen/ examined. ROS x 10 neg. Except: abdomen feels better. Appetite better. Labs reviewed. Complaining of some hip pain  Chart reviewed. meds reviewed. D/w nursing + family as available. EXAM:  /69   Pulse 98   Temp 97.7 °F (36.5 °C) (Oral)   Resp 14   Ht 4' 11\" (1.499 m)   Wt 101 lb 4.8 oz (45.9 kg)   SpO2 95%   BMI 20.46 kg/m²   GEN: A+O NAD. HEENT:NCAT. EOMI. EMEKA   NECK:  No JVD. No bruits. Trach midline. No thyromegaly. LUNGS: CTA w/o rales, rhonchi, wheezes. Good excursion. CV: rrr w/o mrg  ABD: soft, non tender. Nl BS. No organomegaly. EXT: no clubbing, cyanosis, edema.   NEURO:   Labs/data reviewed  LABS: CBC with Differential:    Lab Results   Component Value Date    WBC 9.4 06/10/2019    RBC 4.59 06/10/2019    HGB 15.1 06/10/2019    HCT 43.9 06/10/2019     06/10/2019    MCV 95.6 06/10/2019    MCH 32.9 06/10/2019    MCHC 34.4 06/10/2019    RDW 12.6 06/10/2019    LYMPHOPCT 4.5 06/09/2019    MONOPCT 5.8 06/09/2019    BASOPCT 0.1 06/09/2019    MONOSABS 0.60 06/09/2019    LYMPHSABS 0.47 06/09/2019    EOSABS 0.01 06/09/2019    BASOSABS 0.01 06/09/2019     CMP:    Lab Results   Component Value Date     06/14/2019    K 4.2 06/14/2019    CL 95 06/14/2019    CO2 25 06/14/2019    BUN 13 06/14/2019    CREATININE 0.7 06/14/2019    GFRAA >60 06/14/2019    LABGLOM >60 06/14/2019    GLUCOSE 87 06/14/2019    PROT 6.3 06/10/2019    LABALBU 3.7 06/10/2019    CALCIUM 8.7 06/14/2019    BILITOT 1.1 06/10/2019    ALKPHOS 69 06/10/2019    AST 30 06/10/2019    ALT 15 06/10/2019     PT/INR:    Lab Results   Component Value Date    PROTIME 12.8 06/14/2019    PROTIME 24.9 06/03/2019    INR 1.1 06/14/2019       Medications:    Scheduled Meds:   furosemide  20 mg Intravenous Once    warfarin  5 mg Oral Daily    enoxaparin  1 mg/kg Subcutaneous BID    amLODIPine  5 mg Oral Daily    vitamin C  500 mg Oral Daily    vitamin D  2,000 Units Oral Daily    digoxin  125 mcg Oral Daily    lisinopril 20 mg Oral Daily    metoprolol tartrate  50 mg Oral BID    simvastatin  40 mg Oral Nightly    sucralfate  1 g Oral 4x Daily       Continuous Infusions:    PRN Meds:acetaminophen, sodium chloride flush, ondansetron    A/P:      Patient Active Problem List   Diagnosis    Valvular heart disease    Atrial fibrillation (HCC)    Hyperlipidemia    Hypertension    Cerebral artery occlusion with cerebral infarction [facility]    Hypokalemia    Long term current use of anticoagulant therapy    Encounter for therapeutic drug monitoring    Cerebral artery occlusion with cerebral infarction ([facility]    Small bowel obstruction (HCC)    Hyponatremia    Acute on chronic diastolic congestive heart failure (HCC)    chest x-ray noted-pulmonary edema  Labs noted-hyponatremia    PLAN:dc if ok c surg  Start Lovenox and Coumadin.   Tylenol for pain  Lasix ×1 for pulmonary edema and hyponatremia

## 2019-06-14 NOTE — PROGRESS NOTES
Physical Therapy  Facility/Department: [address] ORTHO SURGERY  Daily Treatment Note  NAME: Sophia Newman  : 1929  MRN: [id number]    Date of Service: 2019     Evaluating Therapist: Balaji Figueroa PT         Room #:  715   DIAGNOSIS: abd pain   [address]     Social:  Pt lives alone  in a  1  floor plan    Prior to admission pt walked with rollator       Initial Evaluation  Date:  19 Treatment      Short Term/ Long Term   Goals   Was pt agreeable to Eval/treatment?  yes   yes     Does pt have pain?  abd pain , minimal   none reported     Bed Mobility  Rolling:  SBA   Supine to sit:  SBA   Sit to supine:  SBA    Scooting:  SBA  SBA  independent    Transfers Sit to stand:  SBA   Stand to sit:  SBA   Stand pivot:  SBA   SB/Supervision  independent    Ambulation     100 feet with ww  with  SBA   75 feet x 2 with w/w SBA  200 feet with  ww  with  Independent    Stair negotiation: ascended and descended NT   4 steps with 2 rails SBA  4  steps with  1 rail with  S/SBA   LE ROM  [facility]       LE strength  4/5        AM- PAC RAW score          Patient education  Pt was educated on PT objectives during treatment session, hand placement during transfers. Patient response to education:   Pt verbalized understanding Pt demonstrated skill Pt requires further education in this area   Yes    yes     Additional Comments: Pt found supine and agreeable to PT. Pt ambulated requiring cues for ww approximation at times with fair carryover. Pt was left in bed with call light left by patient. Chair/bed alarm: no    Time in: 0740  Time out: 0755    Pt is making good progress toward established Physical Therapy goals. Continue with physical therapy current plan of care.   Tyra Riley PTA [id number]

## 2019-06-14 NOTE — PLAN OF CARE
Problem: Pain:  Goal: Pain level will decrease  Description  Pain level will decrease  Outcome: Ongoing     Problem: Falls - Risk of:  Goal: Will remain free from falls  Description  Will remain free from falls  Outcome: Ongoing     Problem:  Bowel/Gastric:  Goal: Control of bowel function will improve  Description  Control of bowel function will improve  Outcome: Ongoing

## 2019-06-14 NOTE — PROGRESS NOTES
Occupational Therapy  OT BEDSIDE TREATMENT NOTE      Date:2019  Patient Name: Sandrine Osman  MRN: [id number]  : 1929  Room: [address] [place], [place] [address]     Evaluating OT: Nathaniel Tan OTR/L [id number]     AM-PAC Daily Activity Raw Score: 18     Recommended Adaptive Equipment: continue to assess      Diagnosis: SBO. Pt presented to ED with abdominal pain from home      Pertinent Medical History: ankylosing spondylitis, CHF, afib, HTN, CVA   Precautions:  falls     Home Living: Pt lives alone in a single story home with 2 steps HR on entry. Laundry basement. Bathroom setup: tub/shower combo seat and grab bars      Prior Level of Function: Mod I with ADLs, paid caregiver assist 2x/wk 3 hr increments with IADLs; completed functional mobility with [employer]  Driving: No     Pain Level: abdominal pain     Cognition: A&O                Functional Assessment:    Initial Eval Status  Date: 19 Treatment session:  Short Term Goals  Treatment frequency: 2-5x/wk PRN x1-3 wks   Feeding Independent      Grooming Set up   Independent   UB Dressing Set up   Independent   LB Dressing Min A  Mod I    Bathing Min A   Mod I   Toileting SBA  Mod I   Bed Mobility  Supine to sit: SBA  SBA supine to sit      Functional Transfers STS: SBA  SBA from bed and  chair Mod I   Functional Mobility SBA with [employer]  Household distance  SBA using w/w   Occasional assist to guide walker in room. Mod I during ADLs   Balance Sitting: fair     Standing: fair at [employer]       Activity Tolerance Fair. Limited by abdominal pain  fair+  standing yanet x6-7 min with fair plus balance during self care tasks         Comments: Pt very pleasant. Remained in chair at end of the session. Education: walker safety    Other: cues for hand placement during transfers. · Pt has made  progress towards set goals.    · Continue with current plan of care      Total Tx Time: [address] [place] [address]

## 2019-06-14 NOTE — CARE COORDINATION
Social Work:    Ang Jesus at [facility] was made aware of possible transfer there today vs weekend. (Piotr, [address], ambulette completed)  Patient's daughter may prefer to drive her mom to snf if needed.     Electronically signed by MELANIE Downs on 6/14/2019 at 2:10 PM

## 2019-06-14 NOTE — PROGRESS NOTES
GENERAL SURGERY  DAILY PROGRESS NOTE  2019    Subjective:  Still feels ok but no BM, passing flatus    Objective:  /79   Pulse 76   Temp 97.6 °F (36.4 °C) (Oral)   Resp 16   Ht 4' 11\" (1.499 m)   Wt 101 lb 4.8 oz (45.9 kg)   SpO2 100%   BMI 20.46 kg/m²     GENERAL:  Laying in bed, awake, alert, cooperative, no apparent distress  HEAD: Normocephalic, atraumatic  EYES: No sclera icterus, pupils equal  LUNGS:  No increased work of breathing  CARDIOVASCULAR:  RR  ABDOMEN:  Soft, much improved tenderness and distention  EXTREMITIES: No edema or swelling  SKIN: Warm and dry    Assessment/Plan:  80 y.o. female w/ SBO- resolving    Continue FLD for now  KUB to eval for contrast progression  Monitor abdominal distention  Would recommend keeping until patient ha a BM and tolerates general diet     Electronically signed by Richie Apgar, DO on 2019 at 10:30 AM

## 2019-06-14 NOTE — PLAN OF CARE
Problem: Pain:  Goal: Control of acute pain  Description  Control of acute pain  Outcome: Met This Shift     Problem: Falls - Risk of:  Goal: Will remain free from falls  Description  Will remain free from falls  6/13/2019 2138 by Jero Landon RN  Outcome: Met This Shift     Problem:  Bowel/Gastric:  Goal: Control of bowel function will improve  Description  Control of bowel function will improve  6/13/2019 2138 by Jero Landon RN  Outcome: Met This Shift     Problem: Cardiac:  Goal: Complications related to the disease process, condition or treatment will be avoided or minimized  Description  Complications related to the disease process, condition or treatment will be avoided or minimized  Outcome: Met This Shift

## 2019-06-15 NOTE — PROGRESS NOTES
Pt seen/ examined. ROS x 10 neg. Except: abdomen feels better. Appetite better. X-ray reviewed with patient. She still not moving her bowelsLabs reviewed. Chart reviewed. meds reviewed. D/w nursing + family as available. EXAM:  /76   Pulse 95   Temp 98 °F (36.7 °C) (Oral)   Resp 16   Ht 4' 11\" (1.499 m)   Wt 105 lb 11.2 oz (47.9 kg) Comment: Recalculated the bed  SpO2 94%   BMI 21.35 kg/m²   GEN: A+O NAD. HEENT:NCAT. EOMI. EMEKA   NECK:  No JVD. No bruits. Trach midline. No thyromegaly. LUNGS: CTA w/o rales, rhonchi, wheezes. Good excursion. CV: rrr w/o mrg  ABD: soft, non tender. Nl BS. No organomegaly. EXT: no clubbing, cyanosis, edema.   NEURO: is unremarkable  Labs/data reviewed  LABS: CBC with Differential:    Lab Results   Component Value Date    WBC 9.4 06/10/2019    RBC 4.59 06/10/2019    HGB 15.1 06/10/2019    HCT 43.9 06/10/2019     06/10/2019    MCV 95.6 06/10/2019    MCH 32.9 06/10/2019    MCHC 34.4 06/10/2019    RDW 12.6 06/10/2019    LYMPHOPCT 4.5 06/09/2019    MONOPCT 5.8 06/09/2019    BASOPCT 0.1 06/09/2019    MONOSABS 0.60 06/09/2019    LYMPHSABS 0.47 06/09/2019    EOSABS 0.01 06/09/2019    BASOSABS 0.01 06/09/2019     CMP:    Lab Results   Component Value Date     06/15/2019    K 3.8 06/15/2019    CL 94 06/15/2019    CO2 28 06/15/2019    BUN 10 06/15/2019    CREATININE 0.7 06/15/2019    GFRAA >60 06/15/2019    LABGLOM >60 06/15/2019    GLUCOSE 94 06/15/2019    PROT 6.3 06/10/2019    LABALBU 3.7 06/10/2019    CALCIUM 9.1 06/15/2019    BILITOT 1.1 06/10/2019    ALKPHOS 69 06/10/2019    AST 30 06/10/2019    ALT 15 06/10/2019     PT/INR:    Lab Results   Component Value Date    PROTIME 15.9 06/15/2019    PROTIME 24.9 06/03/2019    INR 1.4 06/15/2019       Medications:    Scheduled Meds:   warfarin  5 mg Oral Daily    enoxaparin  1 mg/kg Subcutaneous BID    amLODIPine  5 mg Oral Daily    vitamin C  500 mg Oral Daily    vitamin D  2,000 Units Oral Daily   

## 2019-06-15 NOTE — PATIENT CARE CONFERENCE
[facility] Quality Flow/Interdisciplinary Rounds Progress Note        Quality Flow Rounds held on Rajani 15, 2019    Disciplines Attending:  Bedside Nurse, ,  and Nursing Unit [address] was admitted on 6/10/2019  3:11 PM    Anticipated Discharge Date:  Expected Discharge Date: 06/12/19    Disposition:    Yaniv Score:  Yaniv Scale Score: 20    Readmission Risk              Risk of Unplanned Readmission:        13           Discussed patient goal for the day, patient clinical progression, and barriers to discharge. The following Goal(s) of the Day/Commitment(s) have been identified:  Possible discharge to facility today vs. Tomorrow. Adequate GI function.       Mariana Sanchez  Rajani 15, 2019

## 2019-06-15 NOTE — DISCHARGE SUMMARY
Physician Discharge Summary     Patient ID:  Elvira Billy  [id number]  9 y.o.  8/9/1929    Admit date: 6/10/2019    Discharge date and time: 6/15/2019  3:49 PM     Admitting Physician: Paola Cox MD     Discharge Physician: Latoya Laurent      Admission Diagnoses: Small bowel obstruction ([facility] [address] [K56.609]    Discharge Diagnoses:   sbo  Hyponatremia  Chf/pulm edema 2nd to diastolic dysfxn  Persistent afib  chronic anticoagulation  Coagulopathy 2nd to coumadin        Admission Condition: poor    Discharged Condition: fair    Indication for Admission: Small bowel obstruction [address] Course: pt na corrected. diuresesed for chf. Vit k given to gorect inr of 7.4. Consults: general surgery    Significant Diagnostic Studies: abd ct- psbo. Lab Results   Component Value Date     06/15/2019    K 3.8 06/15/2019    CL 94 (L) 06/15/2019    CO2 28 06/15/2019    BUN 10 06/15/2019    CREATININE 0.7 06/15/2019    GLUCOSE 94 06/15/2019    CALCIUM 9.1 06/15/2019    PROT 6.3 (L) 06/10/2019    LABALBU 3.7 06/10/2019    BILITOT 1.1 06/10/2019    ALKPHOS 69 06/10/2019    AST 30 06/10/2019    ALT 15 06/10/2019    LABGLOM >60 06/15/2019    GFRAA >60 06/15/2019          Lab Results   Component Value Date    WBC 9.4 06/10/2019    HGB 15.1 06/10/2019    HCT 43.9 06/10/2019    MCV 95.6 06/10/2019     06/10/2019        Outstanding Order Results     No orders found from 5/12/2019 to 6/11/2019. Treatments: IV hydration and iv lasix.      Discharge Exam:  /76   Pulse 95   Temp 98 °F (36.7 °C) (Oral)   Resp 16   Ht 4' 11\" (1.499 m)   Wt 105 lb 11.2 oz (47.9 kg) Comment: Recalculated the bed  SpO2 94%   BMI 21.35 kg/m²     General Appearance:    Alert, cooperative, no distress, appears stated age   Head:    Normocephalic, without obvious abnormality, atraumatic   Eyes:    PERRL, conjunctiva/corneas clear, EOM's intact, fundi     benign, both eyes   Ears:    Normal TM's and external ear canals, both ears   Nose:   Nares normal, septum midline, mucosa normal, no drainage    or sinus tenderness   Throat:   Lips, mucosa, and tongue normal; teeth and gums normal   Neck:   Supple, symmetrical, trachea midline, no adenopathy;     thyroid:  no enlargement/tenderness/nodules; no carotid    bruit or JVD   Back:     Symmetric, no curvature, ROM normal, no CVA tenderness   Lungs:     Clear to auscultation bilaterally, respirations unlabored   Chest Wall:    No tenderness or deformity    Heart:    Regular rate and rhythm, S1 and S2 normal, no murmur, rub   or gallop   Breast Exam:    No tenderness, masses, or nipple abnormality   Abdomen:     Soft, non-tender, bowel sounds active all four quadrants,     no masses, no organomegaly   Genitalia:    Normal female without lesion, discharge or tenderness   Rectal:    Normal tone ;guaiac negative stool   Extremities:   Extremities normal, atraumatic, no cyanosis or edema   Pulses:   2+ and symmetric all extremities   Skin:   Skin color, texture, turgor normal, no rashes or lesions   Lymph nodes:   Cervical, supraclavicular, and axillary nodes normal   Neurologic:   CNII-XII intact, normal strength, sensation and reflexes     throughout       Disposition: [facility]    Patient Instructions:      Medication List      START taking these medications    amLODIPine 5 MG tablet  Commonly known as:  NORVASC  Take 1 tablet by mouth daily     enoxaparin 40 MG/0.4ML injection  Commonly known as:  LOVENOX  Inject 0.5 mLs into the skin 2 times daily Stop when INR is 2.0     furosemide 20 MG tablet  Commonly known as:  LASIX  Take 1 tablet by mouth daily        CHANGE how you take these medications    * warfarin 5 MG tablet  Commonly known as:  JANTOVEN  Take as directed. If you are unsure how to take this medication, talk to your nurse or doctor. Original instructions:  TAKE ONE TABLET BY MOUTH DAILY  What changed:  Another medication with the same name was added.  Make sure you

## 2019-06-15 NOTE — PROGRESS NOTES
Called  per RN TM  Reason  Patient cleared by surgery for discharge. Need Med Rec complete and Discharge Order.   Harris Dang  6/15/2019  12:03 PM

## 2019-06-15 NOTE — PLAN OF CARE
Problem: Pain:  Goal: Pain level will decrease  Description  Pain level will decrease  Outcome: Met This Shift     Problem: Falls - Risk of:  Goal: Will remain free from falls  Description  Will remain free from falls  Outcome: Met This Shift     Problem:  Bowel/Gastric:  Goal: Control of bowel function will improve  Description  Control of bowel function will improve  Outcome: Met This Shift     Problem: Safety:  Goal: Ability to remain free from injury will improve  Description  Ability to remain free from injury will improve  Outcome: Met This Shift

## 2019-06-15 NOTE — PROGRESS NOTES
ODALYS Dawson at [facility] updated regarding patients discharge, per Ben Human- family to transport patient. All questions answered.

## 2019-06-15 NOTE — PROGRESS NOTES
General Surgery Progress Note    Surgeon:  Dr. Seble Farley covering for Dr. Ahmet Ling  Subjective:   Pain:    None  Diet:    Tolerated  Nausea: None  BM/Flatus: +    /76   Pulse 95   Temp 98 °F (36.7 °C) (Oral)   Resp 16   Ht 4' 11\" (1.499 m)   Wt 105 lb 11.2 oz (47.9 kg) Comment: Recalculated the bed  SpO2 94%   BMI 21.35 kg/m²      General:  no acute distress  Lungs:  non-labored breathing  Heart:   Pulse is regular  Abdomen:  soft, nontender, nondistended, no guarding/rebound tenderness    ASSESSMENT / PLAN:   · SBO - resolving  · + BMs, tolerating diet. · Ok to d/c from surgery standpoint, follow up as needed with Dr. Ahmet Ling.     Franco Bullard MD  6/15/2019  11:17 AM

## 2020-01-01 ENCOUNTER — APPOINTMENT (OUTPATIENT)
Dept: CT IMAGING | Age: 85
DRG: 064 | End: 2020-01-01
Payer: MEDICARE

## 2020-01-01 ENCOUNTER — APPOINTMENT (OUTPATIENT)
Dept: GENERAL RADIOLOGY | Age: 85
DRG: 064 | End: 2020-01-01
Payer: MEDICARE

## 2020-01-01 ENCOUNTER — HOSPITAL ENCOUNTER (INPATIENT)
Age: 85
LOS: 1 days | Discharge: NURSING, REHAB OR LONG-TERM CARE | DRG: 064 | End: 2020-04-06
Attending: EMERGENCY MEDICINE | Admitting: INTERNAL MEDICINE
Payer: MEDICARE

## 2020-01-01 VITALS
TEMPERATURE: 98.6 F | DIASTOLIC BLOOD PRESSURE: 77 MMHG | WEIGHT: 105 LBS | HEIGHT: 59 IN | HEART RATE: 130 BPM | SYSTOLIC BLOOD PRESSURE: 175 MMHG | OXYGEN SATURATION: 97 % | BODY MASS INDEX: 21.17 KG/M2 | RESPIRATION RATE: 18 BRPM

## 2020-01-01 LAB
ALBUMIN SERPL-MCNC: 4 G/DL (ref 3.5–5.2)
ALP BLD-CCNC: 76 U/L (ref 35–104)
ALT SERPL-CCNC: 13 U/L (ref 0–32)
ANION GAP SERPL CALCULATED.3IONS-SCNC: 13 MMOL/L (ref 7–16)
ANION GAP SERPL CALCULATED.3IONS-SCNC: 16 MMOL/L (ref 7–16)
ANISOCYTOSIS: ABNORMAL
APTT: 36.8 SEC (ref 24.5–35.1)
AST SERPL-CCNC: 27 U/L (ref 0–31)
ATYPICAL LYMPHOCYTE RELATIVE PERCENT: 0.9 % (ref 0–4)
B.E.: -0.4 MMOL/L (ref -3–3)
B.E.: -3.4 MMOL/L (ref -3–3)
BASOPHILS ABSOLUTE: 0 E9/L (ref 0–0.2)
BASOPHILS ABSOLUTE: 0.02 E9/L (ref 0–0.2)
BASOPHILS RELATIVE PERCENT: 0.1 % (ref 0–2)
BASOPHILS RELATIVE PERCENT: 0.2 % (ref 0–2)
BILIRUB SERPL-MCNC: 0.4 MG/DL (ref 0–1.2)
BUN BLDV-MCNC: 17 MG/DL (ref 8–23)
BUN BLDV-MCNC: 26 MG/DL (ref 8–23)
BURR CELLS: ABNORMAL
BURR CELLS: ABNORMAL
CALCIUM SERPL-MCNC: 9.3 MG/DL (ref 8.6–10.2)
CALCIUM SERPL-MCNC: 9.9 MG/DL (ref 8.6–10.2)
CHLORIDE BLD-SCNC: 107 MMOL/L (ref 98–107)
CHLORIDE BLD-SCNC: 99 MMOL/L (ref 98–107)
CHP ED QC CHECK: NORMAL
CO2: 19 MMOL/L (ref 22–29)
CO2: 21 MMOL/L (ref 22–29)
COHB: 0.2 % (ref 0–1.5)
COHB: 0.5 % (ref 0–1.5)
CREAT SERPL-MCNC: 0.6 MG/DL (ref 0.5–1)
CREAT SERPL-MCNC: 0.8 MG/DL (ref 0.5–1)
CRITICAL: ABNORMAL
CRITICAL: ABNORMAL
DATE ANALYZED: ABNORMAL
DATE ANALYZED: ABNORMAL
DATE OF COLLECTION: ABNORMAL
DATE OF COLLECTION: ABNORMAL
EKG ATRIAL RATE: 105 BPM
EKG Q-T INTERVAL: 304 MS
EKG QRS DURATION: 102 MS
EKG QTC CALCULATION (BAZETT): 426 MS
EKG R AXIS: 57 DEGREES
EKG T AXIS: -102 DEGREES
EKG VENTRICULAR RATE: 118 BPM
EOSINOPHILS ABSOLUTE: 0 E9/L (ref 0.05–0.5)
EOSINOPHILS ABSOLUTE: 0 E9/L (ref 0.05–0.5)
EOSINOPHILS RELATIVE PERCENT: 0 % (ref 0–6)
EOSINOPHILS RELATIVE PERCENT: 12.2 % (ref 0–6)
GFR AFRICAN AMERICAN: >60
GFR AFRICAN AMERICAN: >60
GFR NON-AFRICAN AMERICAN: >60 ML/MIN/1.73
GFR NON-AFRICAN AMERICAN: >60 ML/MIN/1.73
GLUCOSE BLD-MCNC: 114 MG/DL (ref 74–99)
GLUCOSE BLD-MCNC: 160 MG/DL
GLUCOSE BLD-MCNC: 161 MG/DL (ref 74–99)
HCO3: 20.9 MMOL/L (ref 22–26)
HCO3: 22.3 MMOL/L (ref 22–26)
HCT VFR BLD CALC: 45 % (ref 34–48)
HCT VFR BLD CALC: 47.3 % (ref 34–48)
HEMOGLOBIN: 14.8 G/DL (ref 11.5–15.5)
HEMOGLOBIN: 15.1 G/DL (ref 11.5–15.5)
HHB: 0.5 % (ref 0–5)
HHB: 2.4 % (ref 0–5)
IMMATURE GRANULOCYTES #: 0.15 E9/L
IMMATURE GRANULOCYTES %: 0.7 % (ref 0–5)
INR BLD: 2.2
INR BLD: 4.4
LAB: ABNORMAL
LAB: ABNORMAL
LACTIC ACID, SEPSIS: 3.2 MMOL/L (ref 0.5–1.9)
LACTIC ACID, SEPSIS: 4.8 MMOL/L (ref 0.5–1.9)
LACTIC ACID: 2.2 MMOL/L (ref 0.5–2.2)
LYMPHOCYTES ABSOLUTE: 0.64 E9/L (ref 1.5–4)
LYMPHOCYTES ABSOLUTE: 0.75 E9/L (ref 1.5–4)
LYMPHOCYTES RELATIVE PERCENT: 2.6 % (ref 20–42)
LYMPHOCYTES RELATIVE PERCENT: 3 % (ref 20–42)
Lab: ABNORMAL
Lab: ABNORMAL
MCH RBC QN AUTO: 31 PG (ref 26–35)
MCH RBC QN AUTO: 31.4 PG (ref 26–35)
MCHC RBC AUTO-ENTMCNC: 31.9 % (ref 32–34.5)
MCHC RBC AUTO-ENTMCNC: 32.9 % (ref 32–34.5)
MCV RBC AUTO: 94.3 FL (ref 80–99.9)
MCV RBC AUTO: 98.3 FL (ref 80–99.9)
METER GLUCOSE: 160 MG/DL (ref 74–99)
METHB: 0.2 % (ref 0–1.5)
METHB: 0.4 % (ref 0–1.5)
MODE: ABNORMAL
MODE: ABNORMAL
MONOCYTES ABSOLUTE: 0.75 E9/L (ref 0.1–0.95)
MONOCYTES ABSOLUTE: 1.76 E9/L (ref 0.1–0.95)
MONOCYTES RELATIVE PERCENT: 4.3 % (ref 2–12)
MONOCYTES RELATIVE PERCENT: 8.1 % (ref 2–12)
MRSA CULTURE ONLY: NORMAL
NEUTROPHILS ABSOLUTE: 17.3 E9/L (ref 1.8–7.3)
NEUTROPHILS ABSOLUTE: 19.07 E9/L (ref 1.8–7.3)
NEUTROPHILS RELATIVE PERCENT: 88.1 % (ref 43–80)
NEUTROPHILS RELATIVE PERCENT: 92.3 % (ref 43–80)
O2 CONTENT: 20.6 ML/DL
O2 CONTENT: 21.9 ML/DL
O2 SATURATION: 97.6 % (ref 92–98.5)
O2 SATURATION: 99.5 % (ref 92–98.5)
O2HB: 96.7 % (ref 94–97)
O2HB: 99.1 % (ref 94–97)
OPERATOR ID: 2156
OPERATOR ID: 7490
OVALOCYTES: ABNORMAL
OVALOCYTES: ABNORMAL
PATIENT TEMP: 37 C
PATIENT TEMP: 37 C
PCO2: 31.7 MMHG (ref 35–45)
PCO2: 35.7 MMHG (ref 35–45)
PDW BLD-RTO: 13.2 FL (ref 11.5–15)
PDW BLD-RTO: 13.5 FL (ref 11.5–15)
PH BLOOD GAS: 7.39 (ref 7.35–7.45)
PH BLOOD GAS: 7.47 (ref 7.35–7.45)
PLATELET # BLD: 171 E9/L (ref 130–450)
PLATELET # BLD: 209 E9/L (ref 130–450)
PMV BLD AUTO: 10.2 FL (ref 7–12)
PMV BLD AUTO: 10.7 FL (ref 7–12)
PO2: 262.4 MMHG (ref 75–100)
PO2: 90.3 MMHG (ref 75–100)
POIKILOCYTES: ABNORMAL
POIKILOCYTES: ABNORMAL
POTASSIUM REFLEX MAGNESIUM: 4 MMOL/L (ref 3.5–5)
POTASSIUM SERPL-SCNC: 4.2 MMOL/L (ref 3.5–5)
PROCALCITONIN: 0.09 NG/ML (ref 0–0.08)
PROTHROMBIN TIME: 25.3 SEC (ref 9.3–12.4)
PROTHROMBIN TIME: 51 SEC (ref 9.3–12.4)
RBC # BLD: 4.77 E12/L (ref 3.5–5.5)
RBC # BLD: 4.81 E12/L (ref 3.5–5.5)
SODIUM BLD-SCNC: 136 MMOL/L (ref 132–146)
SODIUM BLD-SCNC: 139 MMOL/L (ref 132–146)
SOURCE, BLOOD GAS: ABNORMAL
SOURCE, BLOOD GAS: ABNORMAL
THB: 15.1 G/DL (ref 11.5–16.5)
THB: 15.3 G/DL (ref 11.5–16.5)
TIME ANALYZED: 1131
TIME ANALYZED: 911
TOTAL PROTEIN: 7.2 G/DL (ref 6.4–8.3)
TOXIC GRANULATION: ABNORMAL
TROPONIN: 0.03 NG/ML (ref 0–0.03)
URINE CULTURE, ROUTINE: NORMAL
WBC # BLD: 18.8 E9/L (ref 4.5–11.5)
WBC # BLD: 21.6 E9/L (ref 4.5–11.5)

## 2020-01-01 PROCEDURE — 82962 GLUCOSE BLOOD TEST: CPT

## 2020-01-01 PROCEDURE — 36415 COLL VENOUS BLD VENIPUNCTURE: CPT

## 2020-01-01 PROCEDURE — 94664 DEMO&/EVAL PT USE INHALER: CPT

## 2020-01-01 PROCEDURE — 85025 COMPLETE CBC W/AUTO DIFF WBC: CPT

## 2020-01-01 PROCEDURE — 87081 CULTURE SCREEN ONLY: CPT

## 2020-01-01 PROCEDURE — 93005 ELECTROCARDIOGRAM TRACING: CPT | Performed by: STUDENT IN AN ORGANIZED HEALTH CARE EDUCATION/TRAINING PROGRAM

## 2020-01-01 PROCEDURE — 2580000003 HC RX 258: Performed by: STUDENT IN AN ORGANIZED HEALTH CARE EDUCATION/TRAINING PROGRAM

## 2020-01-01 PROCEDURE — 2580000003 HC RX 258: Performed by: EMERGENCY MEDICINE

## 2020-01-01 PROCEDURE — 2060000000 HC ICU INTERMEDIATE R&B

## 2020-01-01 PROCEDURE — 87088 URINE BACTERIA CULTURE: CPT

## 2020-01-01 PROCEDURE — 83605 ASSAY OF LACTIC ACID: CPT

## 2020-01-01 PROCEDURE — 6370000000 HC RX 637 (ALT 250 FOR IP): Performed by: INTERNAL MEDICINE

## 2020-01-01 PROCEDURE — 82805 BLOOD GASES W/O2 SATURATION: CPT

## 2020-01-01 PROCEDURE — 6360000002 HC RX W HCPCS: Performed by: INTERNAL MEDICINE

## 2020-01-01 PROCEDURE — 70496 CT ANGIOGRAPHY HEAD: CPT

## 2020-01-01 PROCEDURE — 80048 BASIC METABOLIC PNL TOTAL CA: CPT

## 2020-01-01 PROCEDURE — 70450 CT HEAD/BRAIN W/O DYE: CPT

## 2020-01-01 PROCEDURE — 94640 AIRWAY INHALATION TREATMENT: CPT

## 2020-01-01 PROCEDURE — 84484 ASSAY OF TROPONIN QUANT: CPT

## 2020-01-01 PROCEDURE — 2700000000 HC OXYGEN THERAPY PER DAY

## 2020-01-01 PROCEDURE — 93010 ELECTROCARDIOGRAM REPORT: CPT | Performed by: INTERNAL MEDICINE

## 2020-01-01 PROCEDURE — 2500000003 HC RX 250 WO HCPCS: Performed by: INTERNAL MEDICINE

## 2020-01-01 PROCEDURE — 71045 X-RAY EXAM CHEST 1 VIEW: CPT

## 2020-01-01 PROCEDURE — 71250 CT THORAX DX C-: CPT

## 2020-01-01 PROCEDURE — 85730 THROMBOPLASTIN TIME PARTIAL: CPT

## 2020-01-01 PROCEDURE — 94760 N-INVAS EAR/PLS OXIMETRY 1: CPT

## 2020-01-01 PROCEDURE — 99449 NTRPROF PH1/NTRNET/EHR 31/>: CPT | Performed by: PSYCHIATRY & NEUROLOGY

## 2020-01-01 PROCEDURE — 84145 PROCALCITONIN (PCT): CPT

## 2020-01-01 PROCEDURE — 99223 1ST HOSP IP/OBS HIGH 75: CPT | Performed by: INTERNAL MEDICINE

## 2020-01-01 PROCEDURE — 92523 SPEECH SOUND LANG COMPREHEN: CPT

## 2020-01-01 PROCEDURE — 99233 SBSQ HOSP IP/OBS HIGH 50: CPT | Performed by: INTERNAL MEDICINE

## 2020-01-01 PROCEDURE — 36600 WITHDRAWAL OF ARTERIAL BLOOD: CPT

## 2020-01-01 PROCEDURE — 2580000003 HC RX 258: Performed by: INTERNAL MEDICINE

## 2020-01-01 PROCEDURE — 87040 BLOOD CULTURE FOR BACTERIA: CPT

## 2020-01-01 PROCEDURE — 80053 COMPREHEN METABOLIC PANEL: CPT

## 2020-01-01 PROCEDURE — 99221 1ST HOSP IP/OBS SF/LOW 40: CPT | Performed by: NURSE PRACTITIONER

## 2020-01-01 PROCEDURE — 99285 EMERGENCY DEPT VISIT HI MDM: CPT

## 2020-01-01 PROCEDURE — 70498 CT ANGIOGRAPHY NECK: CPT

## 2020-01-01 PROCEDURE — 85610 PROTHROMBIN TIME: CPT

## 2020-01-01 PROCEDURE — 0042T CT BRAIN PERFUSION: CPT

## 2020-01-01 PROCEDURE — 6360000004 HC RX CONTRAST MEDICATION: Performed by: RADIOLOGY

## 2020-01-01 RX ORDER — SODIUM PHOSPHATE, DIBASIC AND SODIUM PHOSPHATE, MONOBASIC 7; 19 G/133ML; G/133ML
1 ENEMA RECTAL DAILY PRN
Status: DISCONTINUED | OUTPATIENT
Start: 2020-01-01 | End: 2020-01-01 | Stop reason: HOSPADM

## 2020-01-01 RX ORDER — ACETAMINOPHEN 325 MG/1
650 TABLET ORAL EVERY 6 HOURS PRN
Status: DISCONTINUED | OUTPATIENT
Start: 2020-01-01 | End: 2020-01-01 | Stop reason: HOSPADM

## 2020-01-01 RX ORDER — SODIUM CHLORIDE 0.9 % (FLUSH) 0.9 %
10 SYRINGE (ML) INJECTION EVERY 12 HOURS SCHEDULED
Status: DISCONTINUED | OUTPATIENT
Start: 2020-01-01 | End: 2020-01-01 | Stop reason: HOSPADM

## 2020-01-01 RX ORDER — SODIUM CHLORIDE 0.9 % (FLUSH) 0.9 %
10 SYRINGE (ML) INJECTION PRN
Status: DISCONTINUED | OUTPATIENT
Start: 2020-01-01 | End: 2020-01-01 | Stop reason: HOSPADM

## 2020-01-01 RX ORDER — ONDANSETRON 2 MG/ML
4 INJECTION INTRAMUSCULAR; INTRAVENOUS EVERY 6 HOURS PRN
Status: DISCONTINUED | OUTPATIENT
Start: 2020-01-01 | End: 2020-01-01 | Stop reason: HOSPADM

## 2020-01-01 RX ORDER — METOPROLOL TARTRATE 5 MG/5ML
5 INJECTION INTRAVENOUS EVERY 8 HOURS
Status: DISCONTINUED | OUTPATIENT
Start: 2020-01-01 | End: 2020-01-01 | Stop reason: HOSPADM

## 2020-01-01 RX ORDER — ACETAMINOPHEN 650 MG/1
650 SUPPOSITORY RECTAL EVERY 6 HOURS PRN
Status: DISCONTINUED | OUTPATIENT
Start: 2020-01-01 | End: 2020-01-01 | Stop reason: HOSPADM

## 2020-01-01 RX ORDER — 0.9 % SODIUM CHLORIDE 0.9 %
500 INTRAVENOUS SOLUTION INTRAVENOUS ONCE
Status: COMPLETED | OUTPATIENT
Start: 2020-01-01 | End: 2020-01-01

## 2020-01-01 RX ORDER — PROMETHAZINE HYDROCHLORIDE 25 MG/1
12.5 TABLET ORAL EVERY 6 HOURS PRN
Status: DISCONTINUED | OUTPATIENT
Start: 2020-01-01 | End: 2020-01-01 | Stop reason: HOSPADM

## 2020-01-01 RX ORDER — IPRATROPIUM BROMIDE AND ALBUTEROL SULFATE 2.5; .5 MG/3ML; MG/3ML
1 SOLUTION RESPIRATORY (INHALATION) 4 TIMES DAILY
Status: DISCONTINUED | OUTPATIENT
Start: 2020-01-01 | End: 2020-01-01 | Stop reason: HOSPADM

## 2020-01-01 RX ORDER — WARFARIN SODIUM 2.5 MG/1
2.5 TABLET ORAL DAILY
COMMUNITY

## 2020-01-01 RX ORDER — DIGOXIN 0.25 MG/ML
125 INJECTION INTRAMUSCULAR; INTRAVENOUS DAILY
Status: DISCONTINUED | OUTPATIENT
Start: 2020-01-01 | End: 2020-01-01 | Stop reason: HOSPADM

## 2020-01-01 RX ORDER — SODIUM CHLORIDE 9 MG/ML
INJECTION, SOLUTION INTRAVENOUS CONTINUOUS
Status: DISCONTINUED | OUTPATIENT
Start: 2020-01-01 | End: 2020-01-01 | Stop reason: HOSPADM

## 2020-01-01 RX ORDER — 0.9 % SODIUM CHLORIDE 0.9 %
1000 INTRAVENOUS SOLUTION INTRAVENOUS ONCE
Status: COMPLETED | OUTPATIENT
Start: 2020-01-01 | End: 2020-01-01

## 2020-01-01 RX ORDER — HYDRALAZINE HYDROCHLORIDE 20 MG/ML
5 INJECTION INTRAMUSCULAR; INTRAVENOUS EVERY 4 HOURS PRN
Status: DISCONTINUED | OUTPATIENT
Start: 2020-01-01 | End: 2020-01-01 | Stop reason: HOSPADM

## 2020-01-01 RX ADMIN — IPRATROPIUM BROMIDE AND ALBUTEROL SULFATE 1 AMPULE: 2.5; .5 SOLUTION RESPIRATORY (INHALATION) at 20:04

## 2020-01-01 RX ADMIN — SODIUM CHLORIDE 500 ML: 9 INJECTION, SOLUTION INTRAVENOUS at 10:02

## 2020-01-01 RX ADMIN — PIPERACILLIN AND TAZOBACTAM 3.38 G: 3; .375 INJECTION, POWDER, FOR SOLUTION INTRAVENOUS at 23:25

## 2020-01-01 RX ADMIN — SODIUM CHLORIDE, PRESERVATIVE FREE 10 ML: 5 INJECTION INTRAVENOUS at 20:50

## 2020-01-01 RX ADMIN — DIGOXIN 125 MCG: 0.25 INJECTION INTRAMUSCULAR; INTRAVENOUS at 08:35

## 2020-01-01 RX ADMIN — HYDRALAZINE HYDROCHLORIDE 5 MG: 20 INJECTION INTRAMUSCULAR; INTRAVENOUS at 20:48

## 2020-01-01 RX ADMIN — IPRATROPIUM BROMIDE AND ALBUTEROL SULFATE 1 AMPULE: 2.5; .5 SOLUTION RESPIRATORY (INHALATION) at 13:20

## 2020-01-01 RX ADMIN — SODIUM CHLORIDE 25 ML: 9 INJECTION, SOLUTION INTRAVENOUS at 20:49

## 2020-01-01 RX ADMIN — FAMOTIDINE 10 MG: 10 INJECTION INTRAVENOUS at 08:35

## 2020-01-01 RX ADMIN — PIPERACILLIN AND TAZOBACTAM 3.38 G: 3; .375 INJECTION, POWDER, FOR SOLUTION INTRAVENOUS at 08:35

## 2020-01-01 RX ADMIN — METOPROLOL TARTRATE 5 MG: 5 INJECTION INTRAVENOUS at 16:33

## 2020-01-01 RX ADMIN — SODIUM CHLORIDE: 9 INJECTION, SOLUTION INTRAVENOUS at 16:34

## 2020-01-01 RX ADMIN — DIGOXIN 125 MCG: 0.25 INJECTION INTRAMUSCULAR; INTRAVENOUS at 16:33

## 2020-01-01 RX ADMIN — PIPERACILLIN AND TAZOBACTAM 3.38 G: 3; .375 INJECTION, POWDER, FOR SOLUTION INTRAVENOUS at 16:35

## 2020-01-01 RX ADMIN — FAMOTIDINE 10 MG: 10 INJECTION INTRAVENOUS at 16:33

## 2020-01-01 RX ADMIN — VANCOMYCIN HYDROCHLORIDE 500 MG: 500 INJECTION, POWDER, LYOPHILIZED, FOR SOLUTION INTRAVENOUS at 20:56

## 2020-01-01 RX ADMIN — METOPROLOL TARTRATE 5 MG: 5 INJECTION INTRAVENOUS at 23:25

## 2020-01-01 RX ADMIN — SODIUM CHLORIDE 25 ML: 9 INJECTION, SOLUTION INTRAVENOUS at 03:35

## 2020-01-01 RX ADMIN — IPRATROPIUM BROMIDE AND ALBUTEROL SULFATE 1 AMPULE: 2.5; .5 SOLUTION RESPIRATORY (INHALATION) at 09:45

## 2020-01-01 RX ADMIN — IOPAMIDOL 100 ML: 755 INJECTION, SOLUTION INTRAVENOUS at 09:28

## 2020-01-01 RX ADMIN — SODIUM CHLORIDE, PRESERVATIVE FREE 10 ML: 5 INJECTION INTRAVENOUS at 08:36

## 2020-01-01 RX ADMIN — METOPROLOL TARTRATE 5 MG: 5 INJECTION INTRAVENOUS at 07:42

## 2020-01-01 RX ADMIN — SODIUM CHLORIDE 1000 ML: 9 INJECTION, SOLUTION INTRAVENOUS at 13:19

## 2020-01-01 RX ADMIN — IPRATROPIUM BROMIDE AND ALBUTEROL SULFATE 1 AMPULE: 2.5; .5 SOLUTION RESPIRATORY (INHALATION) at 16:35

## 2020-03-25 PROBLEM — I63.50 CEREBRAL ARTERY OCCLUSION WITH CEREBRAL INFARCTION (HCC): Status: RESOLVED | Noted: 2020-01-01 | Resolved: 2020-01-01

## 2020-04-05 PROBLEM — I63.9 ACUTE CEREBROVASCULAR ACCIDENT (HCC): Status: ACTIVE | Noted: 2020-01-01

## 2020-04-05 PROBLEM — I63.9 ACUTE CVA (CEREBROVASCULAR ACCIDENT) (HCC): Status: ACTIVE | Noted: 2020-01-01

## 2020-04-05 NOTE — CONSULTS
[facility]  Department of Internal Medicine  Division of Pulmonary, Critical Care and Sleep Medicine  Consult Note    Gerald Lemos DO, Ronald Rausch, Al Dockery MD, [facility]    Patient: Leidy Torrez  MRN: [id number]  : 1929    Encounter Time: 5:26 PM     Date of Admission: 2020  8:57 AM    Primary Care Physician: Leslye Adair MD    Reason for Consultation: Respiratory Failure     HISTORY OF PRESENT ILLNESS : Leidy Torrez 80 y.o. female was seen in consultation regarding the above chief compliant. This 66-year-old female presents the ER today with chief complaint of CVA. At 855 am  Patient is unable to communicate with me, but per EMS patient's last known well was 5 AM at nursing home, approximately 45 minutes prior to arrival patient was served breakfast when she was found to have right-sided deficit. She has since worsened and was brought into ED. Underwent extensive work up for CVA and IR evaluation. Pulmonary was asked to see because of WBC, oxygen needs and elevated SIR criteria. PAST MEDICAL HISTORY:  has a past medical history of AS (ankylosing spondylitis) (HCC), Atrial fibrillation (HCC), CHF (congestive heart failure) ([address] H/O TIA (transient ischemic attack) and stroke, Hyperlipidemia, Hypertension, Unspecified cerebral artery occlusion with cerebral infarction, and Valvular heart disease. SURGICAL HISTORY:  has a past surgical history that includes Hysterectomy. SOCIAL HISTORY:  reports that she has never smoked. She has never used smokeless tobacco. She reports that she does not drink alcohol or use drugs. FAMILY  HISTORY: family history includes Cancer in her mother. MEDICATIONS:    Prior to Admission medications    Medication Sig Start Date End Date Taking?  Authorizing Provider   warfarin (COUMADIN) 2.5 MG tablet Take 2.5 mg by mouth daily   Yes Historical Provider, MD   amLODIPine (NORVASC) 5 MG tablet Take 1 tablet by mouth daily 6/13/19  Yes Mehreen Roberson MD   furosemide (LASIX) 20 MG tablet Take 1 tablet by mouth daily 6/13/19  Yes Mehreen Roberson MD   sucralfate (CARAFATE) 1 GM tablet Take 1 tablet by mouth 4 times daily 6/7/19  Yes Jodi Daily, DO   metoprolol tartrate (LOPRESSOR) 50 MG tablet Take 2 tablets 2 times daily 5/1/19  Yes Az Owens MD   simvastatin (ZOCOR) 40 MG tablet Take 1 tablet by mouth nightly 5/1/19  Yes Az Owens MD   lisinopril (PRINIVIL;ZESTRIL) 20 MG tablet TAKE ONE TABLET BY MOUTH TWO TIMES A DAY 4/17/19  Yes Az Owens MD   Cholecalciferol (VITAMIN D) 2000 UNITS CAPS capsule Take 2,000 Units by mouth daily. Yes Historical Provider, MD   calcium carbonate (OSCAL) 500 MG TABS tablet Take 500 mg by mouth daily. Yes Historical Provider, MD   warfarin (COUMADIN) 5 MG tablet 1 qd 6/15/19   Mehreen Roberson MD   enoxaparin (LOVENOX) 40 MG/0.4ML injection Inject 0.5 mLs into the skin 2 times daily Stop when INR is 2.0 6/13/19   Mehreen Roberson MD   ondansetron (ZOFRAN ODT) 4 MG disintegrating tablet Take 1 tablet by mouth every 8 hours as needed for Nausea or Vomiting 6/7/19   Jodi Daily, DO   warfarin (JANTOVEN) 5 MG tablet TAKE ONE TABLET BY MOUTH DAILY 2/13/19   Jonathon Messina MD   digoxin (LANOXIN) 125 MCG tablet Take 1 tablet by mouth daily 1/8/19   Az Owens MD       ALLERGIES: Aspirin       REVIEW OF SYSTEMS: Unable to communicate    OBJECTIVE:     PHYSICAL EXAM:   VITALS:     Intake/Output Summary (Last 24 hours) at 4/5/2020 1726  Last data filed at 4/5/2020 1517  Gross per 24 hour   Intake --   Output 450 ml   Net -450 ml        CONSTITUTIONAL:   Alert on NRB  SKIN:     Skin discoloration  HEENT:     EOMI, MMM, No thrush  NECK:    No bruits, No JVP apprechiated  CV:      MR SABA murmur, No rubs, No gallops  PULMONARY:   Couse BS,  Scattered rhonchi, right sided egophony  ABDOMEN:     Soft, non-tender.  BS normal. No R/R/G  EXT:    No deformities . No clubbing.       + lower extremity edema, No venous stasis  PULSE:   Appears equal and palpable. PSYCHIATRIC:  somulant  MS:    Weakness  NEUROLOGIC:   The clinical assessment is non-focal     DATA: IMAGING & TESTING:     LABORATORY TESTS:    CBC:   Lab Results   Component Value Date    WBC 18.8 2020    RBC 4.77 2020    HGB 14.8 2020    HCT 45.0 2020    MCV 94.3 2020    MCH 31.0 2020    MCHC 32.9 2020    RDW 13.2 2020     2020    MPV 10.2 2020     CMP:    Lab Results   Component Value Date     2020    K 4.2 2020    CL 99 2020    CO2 21 2020    BUN 26 2020    CREATININE 0.8 2020    GFRAA >60 2020    LABGLOM >60 2020    GLUCOSE 161 2020    PROT 7.2 2020    LABALBU 4.0 2020    CALCIUM 9.9 2020    BILITOT 0.4 2020    ALKPHOS 76 2020    AST 27 2020    ALT 13 2020        PRO-BNP:   Lab Results   Component Value Date    PROBNP 3,876 (H) 2019      ABGs:   Lab Results   Component Value Date    PH 7.386 2020    PO2 262.4 2020    PCO2 35.7 2020     Hemoglobin A1C: No components found for: HGBA1C    IMAGING:  Imaging tests were completed and reviewed and discussed radiology and care team involved and reveals   Ct Head Wo Contrast    Result Date: 2020  Patient MRN:  [id number] : 1929 Age: 80 years Gender: Female Order Date:  2020 9:15 AM Exam: CTA NECK W CONTRAST, CT BRAIN PERFUSION, CTA HEAD W CONTRAST, CT HEAD WO CONTRAST Indication:  concern for cva vs bleed concern for cva vs bleed Number of images:  1617 CTA, 202 CTP, 108 CT Contrast: Iopamidol 100 mL intravenous Comparison: None. Technique: Axial computed tomographic images of the head were obtained without contrast. Noncontrast images were reformatted in coronal and sagittal planes.   Using a CT angiographic protocol, axial CT images cerebral artery territory, likely via collateral circulation. Within the sylvian fissure, there is moderate to severe focal stenosis in a distal M2 branch of the left middle cerebral artery (series 5 images 90-92), which appears to be at a bifurcation point; an M3 branch which originates at the site of stenosis appears to be occluded. The right middle cerebral artery is patent. The right and left anterior cerebral arteries are patent. The right vertebral artery is grossly patent but the T1 and T2 segments are small and the A2 segment demonstrates narrowing and poor enhancement in the mid and distal vessel. 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 The left vertebral artery is very poorly visualized in its V1 segment and the proximal half of its V2 segment and may be occluded. Enhancement of the rest of the left vertebral artery may be by collateral and retrograde flow. The basilar artery is patent. The right and left posterior cerebral arteries are grossly patent. CT Perfusion of the Brain Most of the left middle cerebral artery territory demonstrates delayed, diminished cerebral blood flow. There is a moderately large region of diminished cerebral blood volume in the left middle cerebral artery territory involving much of the left frontal lobe, left basal ganglia, anterior and mesial left temporal lobe, and anterior left parietal lobe. Estimation of infarct volume is not possible at the time of this dictation due to lack of Viz. ai-processed images. 1. No sign of acute intracranial hemorrhage or major mass effect. 2. Proximal occlusion of the M1 segment of the left middle cerebral artery with evidence of some collateral circulation in the left cerebral hemisphere. Evidence of occlusion of at least one M3 branch of the left middle cerebral artery.  3. Diminished perfusion throughout the left middle cerebral artery territory with evidence of a moderately large core infarct. Patchy loss of gray-white differentiation on noncontrast CT involving the left basal ganglia and left temporal lobe corroborate the perfusion imaging findings. Assessment is limited by lack of Viz. ai images. Infarct volume calculations are not available. 4. Poor enhancement of the proximal left vertebral artery, which may be due to poor flow and/or intravascular thrombosis. 5. 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 ALERT:  THIS IS AN ABNORMAL REPORT The findings were discussed with ED physician assistant Kwame Vaughn at approximately 21  hours on 2020. The ED attending was acutely managing another patient and was temporarily unavailable at the time of my call. Ct Chest Wo Contrast    Result Date: 2020  Patient MRN:  [id number] Patient :  1929 Patient Age:  80 years Patient Gender:  Female Order Date:2020 9:15 AM EXAM:  CT CHEST WO CONTRAST INDICATION:   concern for covid concern for covid  COMPARISON: None. Technique: Low-dose CT  acquisition technique included one of following options; 1 . Automated exposure control, 2. Adjustment of MA and or KV according to patient's size or 3. Use of iterative reconstruction. Multiple axial images were obtained from the apices of the lungs through the lung bases. Sagittal and coronal reconstructions performed for aid in interpretation of the study. Phoebe Finders FINDINGS: Small pulmonary opacity in the posterior, parahilar region of the right middle lobe is nonspecific and may represent atelectasis or pneumonia. Minimal subpleural pulmonary opacity overlying the small right pleural effusion likely represents compressive atelectasis. The central airway is clear. No pneumothorax or pleural effusion. The heart is enlarged. Mild calcified atherosclerosis is seen in the thoracic aorta which is tortuous but not aneurysmal. The pulmonary arterial trunk is of normal caliber.  No lymphadenopathy seen in the chest. The upper abdomen included in the field-of-view of this study demonstrates no acute abnormality. Evaluation of the bones reveals no fracture or destructive lesion. 1. Small pulmonary opacity in the posterior aspect of the right middle lobe is nonspecific and may represent atelectasis or pneumonia. 2. Cardiomegaly with small right pleural effusion. 3. While characteristic CT findings of COVID-19 are not found, developing pneumonia (of unclear infectious source) in the right middle lobe cannot be excluded. Ct Brain Perfusion    Result Date: 4/5/2020  1. No sign of acute intracranial hemorrhage or major mass effect. 2. Proximal occlusion of the M1 segment of the left middle cerebral artery with evidence of some collateral circulation in the left cerebral hemisphere. Evidence of occlusion of at least one M3 branch of the left middle cerebral artery. 3. Diminished perfusion throughout the left middle cerebral artery territory with evidence of a moderately large core infarct. Patchy loss of gray-white differentiation on noncontrast CT involving the left basal ganglia and left temporal lobe corroborate the perfusion imaging findings. Assessment is limited by lack of Viz. ai images. Infarct volume calculations are not available. 4. Poor enhancement of the proximal left vertebral artery, which may be due to poor flow and/or intravascular thrombosis. 5. 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 ALERT:  THIS IS AN ABNORMAL REPORT The findings were discussed with ED physician assistant Jaiden Mason at approximately 21  hours on 4/5/2020. The ED attending was acutely managing another patient and was temporarily unavailable at the time of my call. Cta Head W Contrast  1. No sign of acute intracranial hemorrhage or major mass effect.  2. Proximal occlusion of the M1 segment of the left middle cerebral artery with evidence of some collateral circulation in the left cerebral hemisphere. Evidence of occlusion of at least one M3 branch of the left middle cerebral artery. 3. Diminished perfusion throughout the left middle cerebral artery territory with evidence of a moderately large core infarct. Patchy loss of gray-white differentiation on noncontrast CT involving the left basal ganglia and left temporal lobe corroborate the perfusion imaging findings. Assessment is limited by lack of Viz. ai images. Infarct volume calculations are not available. 4. Poor enhancement of the proximal left vertebral artery, which may be due to poor flow and/or intravascular thrombosis. 5. 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 ALERT:  THIS IS AN ABNORMAL REPORT The findings were discussed with ED physician assistant Laura Sanchez at approximately 21  hours on 4/5/2020. The ED attending was acutely managing another patient and was temporarily unavailable at the time of my call. ECHOCARDIOGRAM: (2017):   Ejection fraction is visually estimated at 55%. No regional wall motion abnormalities seen. Normal right ventricle structure and function. Left atrial volume index of 77 ml per meters squared BSA. The aortic valve is trileaflet. Severe aortic stenosis is present. Peak   velocity is 4.5 m/s. The aortic valve area is 0.5 cm2 with a maximum   gradient of 81 mmHg and a mean gradient of 48 mmHg. Moderate aortic   regurgitation is noted. Mild mitral stenosis. Moderate mitral regurgitation is present. Mild tricuspid regurgitation. Pulmonary hypertension is mild. RVSP is 47 mmHg. Assessment:  80year old with VHD, HTN, Afib on coumadin with acute left MCA M1 occlusion with evidence of early infarct on CT and CTperfusion from a NH. 1. CVA  2. Hypoxemic Respiratory Failure  3. Plan:   1. Start antibiotics for pneumonia ?  Aspiration 2. IPPB  3. Nebulizers  4. HOB   5. Aspiration risks  6. NPO   7. Lower IVF and monitor volume status  8. Pallative Care assessment  9.        Jessica Sweeney, MPH, [facility]  Professor of Medicine  Pulmonary, Critical Care and Sleep Medicine

## 2020-04-05 NOTE — ED NOTES
Bed: 21  Expected date:   Expected time:   Means of arrival:   Comments:  ems     Brandi Donovan RN  04/05/20 2482

## 2020-04-05 NOTE — ED PROVIDER NOTES
correctly  1C: Tell Patient To Open and Close Eyes, then Hand  Squeeze 2 - performs neither task correctly  2: Test Horizontal Extraocular Movements 0 - normal  3: Test Visual Fields 0 - no visual loss  4: Test Facial Palsy 0 - normal symmetric movement  5A: Test Left Arm Motor Drift 2 - some effort against gravity, limb cannot get to or maintain (if cued) 90 (or 45) degrees, drifts down to bed, but has some effort against gravity   5B: Test Right Arm Motor Drift 2 - some effort against gravity, limb cannot get to or maintain (if cued) 90 (or 45) degrees, drifts down to bed, but has some effort against gravity   6A: Test Left Leg Motor Drift 2 - some effort against gravity; leg falls to bed by 5 seconds but has some effort against gravity  6B: Test Right Leg Motor Drift 2 - some effort against gravity; leg falls to bed by 5 seconds but has some effort against gravity  7: Test Limb Ataxia   (FNF/Heel-Shin) 0 - absent  8: Test Sensation 0 - normal; no sensory loss  9: Test Language/Aphasia 3 - mute, global aphasia; no usable speech or auditory comprehension  10: Test Dysarthria 2 - severe; patient speech is so slurred as to be unintelligible in the absence of or our of proportion to any dysphagia, or is mute/anarthric   11: Test Extinction/Inattention 0 - no abnormality  Total 19            Procedures     MDM  Number of Diagnoses or Management Options  Cerebrovascular accident (CVA) due to embolism of left middle cerebral artery [facility]):   Diagnosis management comments: Patient has multiple occlusions on CTA including left MCA, his ganglia, temporal lobe, left vertebral artery. Patient is outside of the 4-hour window for TPA and is also on Coumadin with an elevated INR further eliminating TPA as a treatment choice. It is a noncandidate. Spoke to Dr. Jacki Lennox who states that patient is not a good dimensional candidate. Dr. Arturo Godoy also spoke to patient's daughter.   Decision was made to advance conservatively with Platelets 224 770 - 313 E9/L    MPV 10.2 7.0 - 12.0 fL    Neutrophils % 92.3 (H) 43.0 - 80.0 %    Lymphocytes % 2.6 (L) 20.0 - 42.0 %    Monocytes % 4.3 2.0 - 12.0 %    Eosinophils % 12.2 (H) 0.0 - 6.0 %    Basophils % 0.2 0.0 - 2.0 %    Neutrophils Absolute 17.30 (H) 1.80 - 7.30 E9/L    Lymphocytes Absolute 0.75 (L) 1.50 - 4.00 E9/L    Monocytes Absolute 0.75 0.10 - 0.95 E9/L    Eosinophils Absolute 0.00 (L) 0.05 - 0.50 E9/L    Basophils Absolute 0.00 0.00 - 0.20 E9/L    Atypical Lymphocytes Relative 0.9 0.0 - 4.0 %    Toxic Granulation 1+     Anisocytosis 1+     Poikilocytes 1+     Diane Cells 1+     Ovalocytes 1+    Comprehensive Metabolic Panel   Result Value Ref Range    Sodium 136 132 - 146 mmol/L    Potassium 4.2 3.5 - 5.0 mmol/L    Chloride 99 98 - 107 mmol/L    CO2 21 (L) 22 - 29 mmol/L    Anion Gap 16 7 - 16 mmol/L    Glucose 161 (H) 74 - 99 mg/dL    BUN 26 (H) 8 - 23 mg/dL    CREATININE 0.8 0.5 - 1.0 mg/dL    GFR Non-African American >60 >=60 mL/min/1.73    GFR African American >60     Calcium 9.9 8.6 - 10.2 mg/dL    Total Protein 7.2 6.4 - 8.3 g/dL    Alb 4.0 3.5 - 5.2 g/dL    Total Bilirubin 0.4 0.0 - 1.2 mg/dL    Alkaline Phosphatase 76 35 - 104 U/L    ALT 13 0 - 32 U/L    AST 27 0 - 31 U/L   Troponin   Result Value Ref Range    Troponin 0.03 0.00 - 0.03 ng/mL   Protime-INR   Result Value Ref Range    Protime 25.3 (H) 9.3 - 12.4 sec    INR 2.2    APTT   Result Value Ref Range    aPTT 36.8 (H) 24.5 - 35.1 sec   Procalcitonin   Result Value Ref Range    Procalcitonin 0.09 (H) 0.00 - 0.08 ng/mL   Lactate, Sepsis   Result Value Ref Range    Lactic Acid, Sepsis 3.2 (H) 0.5 - 1.9 mmol/L   Lactate, Sepsis   Result Value Ref Range    Lactic Acid, Sepsis 4.8 (HH) 0.5 - 1.9 mmol/L   Blood Gas, Arterial   Result Value Ref Range    Date Analyzed [id number]     Time Analyzed 0911     Source: Blood Arterial     pH, Blood Gas 7.386 7.350 - 7.450    PCO2 35.7 35.0 - 45.0 mmHg    PO2 262.4 (H) 75.0 - 100.0 mmHg    HCO3 20.9 (L) 22.0 - 26.0 mmol/L    B.E. -3.4 (L) -3.0 - 3.0 mmol/L    O2 Sat 99.5 (H) 92.0 - 98.5 %    O2Hb 99.1 (H) 94.0 - 97.0 %    COHb 0.2 0.0 - 1.5 %    MetHb 0.2 0.0 - 1.5 %    O2 Content 21.9 mL/dL    HHb 0.5 0.0 - 5.0 %    tHb (est) 15.3 11.5 - 16.5 g/dL    Mode NRB 15L     Date Of Collection      Time Collected      Pt Temp 37.0 C     ID [id number]     Lab [id number]     Critical(s) Notified . No Critical Values    POCT Glucose   Result Value Ref Range    Glucose 160 mg/dL    QC OK? okay    POCT Glucose   Result Value Ref Range    Meter Glucose 160 (H) 74 - 99 mg/dL       RADIOLOGY:  CTA HEAD W CONTRAST   Final Result   1. No sign of acute intracranial hemorrhage or major mass effect. 2. Proximal occlusion of the M1 segment of the left middle cerebral   artery with evidence of some collateral circulation in the left   cerebral hemisphere. Evidence of occlusion of at least one M3 branch   of the left middle cerebral artery. 3. Diminished perfusion throughout the left middle cerebral artery   territory with evidence of a moderately large core infarct. Patchy   loss of gray-white differentiation on noncontrast CT involving the   left basal ganglia and left temporal lobe corroborate the perfusion   imaging findings. Assessment is limited by lack of Viz. ai images. Infarct volume calculations are not available. 4. Poor enhancement of the proximal left vertebral artery, which may   be due to poor flow and/or intravascular thrombosis. 5. 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 ALERT:  THIS IS AN ABNORMAL REPORT      The findings were discussed with ED physician assistant Nabil Dangelo at   approximately 21  hours on 4/5/2020. The ED attending was acutely   managing another patient and was temporarily unavailable at the time   of my call. CT BRAIN PERFUSION   Final Result   1.  No sign of acute intracranial hemorrhage or major mass effect. 2. Proximal occlusion of the M1 segment of the left middle cerebral   artery with evidence of some collateral circulation in the left   cerebral hemisphere. Evidence of occlusion of at least one M3 branch   of the left middle cerebral artery. 3. Diminished perfusion throughout the left middle cerebral artery   territory with evidence of a moderately large core infarct. Patchy   loss of gray-white differentiation on noncontrast CT involving the   left basal ganglia and left temporal lobe corroborate the perfusion   imaging findings. Assessment is limited by lack of Viz. ai images. Infarct volume calculations are not available. 4. Poor enhancement of the proximal left vertebral artery, which may   be due to poor flow and/or intravascular thrombosis. 5. 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 ALERT:  THIS IS AN ABNORMAL REPORT      The findings were discussed with ED physician assistant Rema Jane at   approximately 21  hours on 4/5/2020. The ED attending was acutely   managing another patient and was temporarily unavailable at the time   of my call. CTA NECK W CONTRAST   Final Result   1. No sign of acute intracranial hemorrhage or major mass effect. 2. Proximal occlusion of the M1 segment of the left middle cerebral   artery with evidence of some collateral circulation in the left   cerebral hemisphere. Evidence of occlusion of at least one M3 branch   of the left middle cerebral artery. 3. Diminished perfusion throughout the left middle cerebral artery   territory with evidence of a moderately large core infarct. Patchy   loss of gray-white differentiation on noncontrast CT involving the   left basal ganglia and left temporal lobe corroborate the perfusion   imaging findings. Assessment is limited by lack of Viz. ai images. Infarct volume calculations are not available.

## 2020-04-05 NOTE — CONSULTS
endovascular intervention at this time. I spoke by phone with the patient's daughter, Doreen Fisher and her  Isac Campbell in the presence of IR nurse Yoko Gauthier RN and RT Aleksandar Gloria. I spoke with them again with RT Aleksandar Gloria after examining the patient. According to the patient's daughter, the patient's POA documents do indicate that she is DNR under some circumstances. Doreen Fisher and Isac Campbell stated that they are in complete agreement with not pursuing endovascular intervention. I spoke with Dr. Jhonatan Siddiqui in the ED.     Electronically signed by Poonam Webb MD on 4/5/2020 at 12:36 PM

## 2020-04-05 NOTE — VIRTUAL HEALTH
[facility] Stroke and Vascular Neurology Consult for  [address] Stroke Alert through [address] @ 9:30 am  4/5/2020 10:37 AM  Pt Name: Corrina So  MRN: [id number]  YOB: 1929  Date of evaluation: 4/5/2020  Primary Care Physician: Ruben Wilkinson MD  Reason for Evaluation: Stroke evaluation with Phone Consult, Discussion and Review of imaging    Corrina So is a 80 y.o. female with past medical history including atrial fibrillation on Coumadin, congestive heart failure, history of TIA, hypertension and hyperlipidemia. Patient was last seen normal this morning at 5 AM.  Then she was found around 8 AM with right-sided weakness/speech difficulty. Reported NIH stroke scale at 23. Patient is not a TPA candidate as she is on Coumadin with elevated INR. Allergies  is allergic to aspirin. Medications  Prior to Admission medications    Medication Sig Start Date End Date Taking?  Authorizing Provider   warfarin (COUMADIN) 5 MG tablet 1 qd 6/15/19   Yumiko Arevalo MD   amLODIPine (NORVASC) 5 MG tablet Take 1 tablet by mouth daily 6/13/19   Yumiko Arevalo MD   furosemide (LASIX) 20 MG tablet Take 1 tablet by mouth daily 6/13/19   Yumiko Arevalo MD   enoxaparin (LOVENOX) 40 MG/0.4ML injection Inject 0.5 mLs into the skin 2 times daily Stop when INR is 2.0 6/13/19   Yumiko Arevalo MD   sucralfate (CARAFATE) 1 GM tablet Take 1 tablet by mouth 4 times daily 6/7/19   Katharine Christianson DO   ondansetron (ZOFRAN ODT) 4 MG disintegrating tablet Take 1 tablet by mouth every 8 hours as needed for Nausea or Vomiting 6/7/19   Katharine Christianson DO   metoprolol tartrate (LOPRESSOR) 50 MG tablet Take 2 tablets 2 times daily 5/1/19   Ruben Wilkinson MD   simvastatin (ZOCOR) 40 MG tablet Take 1 tablet by mouth nightly 5/1/19   Ruben Wilkinson MD   lisinopril (PRINIVIL;ZESTRIL) 20 MG tablet TAKE ONE TABLET BY MOUTH TWO TIMES A DAY 4/17/19   Ruben Wilkinson MD   warfarin

## 2020-04-06 NOTE — CARE COORDINATION
Patient to discharge back to [address] [place] [address] with HOTV services per daughter request.  Have call Coral at [facility] they are in agreement with patient returning under hospice. Have called and arranged transport via [facility] ambulance for 3:30p. Called and spoke with daughter, Junie Alarcon, she is in agreement with discharge at 3:30p and would like for spiritual care to visit first.  SW called and spoke with Marlin Logan in spiritual care, he will visit with patient. All discharge paperwork on soft chart.

## 2020-04-06 NOTE — CONSULTS
agree that their mother would not want to live this way and would not want any type of \" heroic measures\". Discussed DNR CC and both daughters are in agreement with comfort measures only at this time. They both would like hospice services also at this time. Family choice and they select hospice of the [place]. Order placed. Support offered and questions answered. - Family Meeting:   Participants:No family meeting held today    -Surrogate/Legal NOK: Child and HCPOA    Contacts:   Marti Nunez, daughter/HCPOA, [phone]  Gauri Bustos, daughter, [phone]     Past Medical History:   Diagnosis Date    AS [facility]ylosing spondylitis) [facility]     severe    Atrial fibrillation ([facility]     CHF (congestive heart failure) ([facility]     H/O TIA (transient ischemic attack) and stroke     Hyperlipidemia     Hypertension     Unspecified cerebral artery occlusion with cerebral infarction     Valvular heart disease        Past Surgical History:   Procedure Laterality Date    HYSTERECTOMY         Family History   Problem Relation Age of Onset    Cancer Mother         stomach       Social history:  [place] status: Unknown  Marital status: Unknown  Living status: Nursing facility  Work history: Unknown    Allergies   Allergen Reactions    Aspirin Other (See Comments)     Upset stomach       ROS: UNABLE TO ASSESS DUE TO CURRENT MENTAL STATUS    Objective:     Physical Exam  BP (!) 160/96   Pulse 93   Temp 98.1 °F (36.7 °C) (Axillary)   Resp 18   Ht 4' 11\" (1.499 m)   Wt 105 lb (47.6 kg)   SpO2 97%   BMI 21.21 kg/m²     Gen: Responsive to deep painful stimuli only, thin and ill appearing  HEENT:  Normocephalic, conjunctiva pink, no drainage, mucosa moist  Neck:  Supple  Lungs:  CTA bilaterally, no audible rhonchi or wheezes noted  Heart[de-identified]  RRR, no murmur, rub, or gallop noted during exam  Abd:  Soft, non tender, non distended, BS+  Ext: no edema, pulses present  Skin:  Warm and dry  Neuro: Does not follow

## 2020-04-06 NOTE — PROGRESS NOTES
[place]  Department of Internal Medicine  Division of Pulmonary, Critical Care and Sleep Medicine  Consult Note    Mora Ram DO, [facility], [place], Renetta Jose MD, [facility]    Patient: Shannon Michel  MRN: [id number]  : 1929    Encounter Time: 10:54 AM     Date of Admission: 2020  8:57 AM    Primary Care Physician: Valarie Tolentino MD    Reason for Consultation: Respiratory Failure    SUBJECTIVE:    Looks worse  Pupil dilated   Unresponsive to name  Withdrawl to deep stimuli      OBJECTIVE:     PHYSICAL EXAM:   VITALS:     Intake/Output Summary (Last 24 hours) at 2020 1054  Last data filed at 2020 3343  Gross per 24 hour   Intake 981.3 ml   Output 825 ml   Net 156.3 ml        CONSTITUTIONAL:   No longer alert  on NRB  SKIN:     Skin discoloration  HEENT:     Dry MM  NECK:    No bruits, No JVP apprechiated  CV:      MR SABA murmur, No rubs, No gallops  PULMONARY:   Couse BS,  Scattered rhonchi, right sided egophony  ABDOMEN:     Soft, non-tender. BS normal. No R/R/G  EXT:    No deformities . No clubbing. No lower extremity edema, No venous stasis  PULSE:   Appears equal and palpable.   PSYCHIATRIC:  somulant  MS:    Weakness  NEUROLOGIC:   Focal     DATA: IMAGING & TESTING:     LABORATORY TESTS:    CBC:   Lab Results   Component Value Date    WBC 21.6 2020    RBC 4.81 2020    HGB 15.1 2020    HCT 47.3 2020    MCV 98.3 2020    MCH 31.4 2020    MCHC 31.9 2020    RDW 13.5 2020     2020    MPV 10.7 2020     CMP:    Lab Results   Component Value Date     2020    K 4.0 2020     2020    CO2 19 2020    BUN 17 2020    CREATININE 0.6 2020    GFRAA >60 2020    LABGLOM >60 2020    GLUCOSE 114 2020    PROT 7.2 2020    LABALBU 4.0 2020    CALCIUM 9.3 2020    BILITOT 0.4 2020    ALKPHOS 76 2020 AST 27 2020    ALT 13 2020        PRO-BNP:   Lab Results   Component Value Date    PROBNP 3,876 (H) 2019      ABGs:   Lab Results   Component Value Date    PH 7.386 2020    PO2 262.4 2020    PCO2 35.7 2020     Hemoglobin A1C: No components found for: HGBA1C    IMAGING:  Imaging tests were completed and reviewed and discussed radiology and care team involved and reveals   Ct Head Wo Contrast    Result Date: 2020  Patient MRN:  [id number] : 1929 Age: 80 years Gender: Female Order Date:  2020 9:15 AM Exam: CTA NECK W CONTRAST, CT BRAIN PERFUSION, CTA HEAD W CONTRAST, CT HEAD WO CONTRAST Indication:  concern for cva vs bleed concern for cva vs bleed Number of images:  1617 CTA, 202 CTP, 108 CT Contrast: Iopamidol 100 mL intravenous Comparison: None. Technique: Axial computed tomographic images of the head were obtained without contrast. Noncontrast images were reformatted in coronal and sagittal planes. Using a CT angiographic protocol, axial CT images of the head and neck were obtained with bolus administration of intravenous contrast. Maximum intensity projection (MIP) images were generated in coronal and sagittal planes. Rotating MIP images of the intracranial arterial circulation were created. Rotating curved planar reformations were generated for the cervical left and right carotid arteries. Three-dimensional (3-D) volume-rendered images of the intracranial and cervical vessels were created. CT perfusion imaging of the brain was performed with injection of intravenous contrast. Axial maps of cerebral blood flow (CBF), cerebral blood volume (CBV), mean transit time (MTT), and time to drain (TTD) were generated. Low-dose CT acquisition technique included one of following options: (1) automated exposure control;  (2) adjustment of mA and/or kV according to patient's size; or (3) use of iterative reconstruction.  FINDINGS: Noncontrast Head CT There is no sign of acute

## 2020-04-06 NOTE — DISCHARGE INSTR - COC
Continuity of Care Form    Patient Name: Hayden Azevedo   :  1929  MRN:  [id number]    Admit date:  2020  Discharge date:  2020    Code Status Order: [facility]   Advance Directives:   [facility] Documentation     Date/Time Healthcare Directive Type of Healthcare Directive Copy in [address] Agent's Name Healthcare Agent's Phone Number    [phone]  Yes, patient has an advance directive for healthcare treatment  Durable power of  for health care;Living will  No, copy requested from medical records  Adult Children  Marti  --          Admitting Physician:  Ayaka Del Cid MD  PCP: Mariangel Botello MD    Discharging Nurse: [facility] Unit/Room#: 8014/9709-H  [facility] Unit Phone Number: [phone]    Emergency Contact:   Extended Emergency Contact Information  Primary Emergency Contact: Marti Kothari  Address: [address] Phone: [phone]  Mobile Phone: [phone]  Relation: Child  Secondary Emergency Contact: [address] Phone: [phone]  Mobile Phone: [phone]  Relation: Child    Past Surgical History:  Past Surgical History:   Procedure Laterality Date    HYSTERECTOMY         Immunization History:   Immunization History   Administered Date(s) Administered    Influenza Virus Vaccine 10/03/2014    Influenza, High Dose (Fluzone 65 yrs and older) 10/09/2015, 10/04/2016, 10/13/2017, 10/19/2018    Pneumococcal Conjugate 13-valent (Jzshggy13) 10/30/2015    Pneumococcal Polysaccharide ([id number]) 2013    Td, unspecified formulation 2017       Active Problems:  Patient Active Problem List   Diagnosis Code    Valvular heart disease I38    Atrial fibrillation ([facility] [address] I48.91    Hyperlipidemia E78.5    Hypertension I10    Hypokalemia E87.6    Long term current use of anticoagulant therapy Z79.01    Encounter for therapeutic drug monitoring Z51.81    Cerebral artery occlusion with cerebral infarction ([facility] [address] I63.50    Small bowel obstruction ([facility]) K56.609    Hyponatremia E87.1    Acute on chronic diastolic congestive heart failure ([facility]) I50.33    Acute cerebrovascular accident ([facility] [address] I63.9    Acute CVA (cerebrovascular accident) ([facility] [address] I63.9    Cerebrovascular accident (CVA) due to embolism of left middle cerebral artery (HCC) K48.601    Acute ischemic left middle cerebral artery (MCA) stroke ([facility]) I63.512    Nihss score 19 R29.719    Anticoagulated on warfarin Z79.01       Isolation/Infection:   Isolation          No Isolation        Patient Infection Status     None to display          Nurse Assessment:  Last Vital Signs: BP (!) 175/77   Pulse 130   Temp 98.6 °F (37 °C) (Axillary)   Resp 18   Ht 4' 11\" (1.499 m)   Wt 105 lb (47.6 kg)   SpO2 97%   BMI 21.21 kg/m²     Last documented pain score (0-10 scale):    Last Weight:   Wt Readings from Last 1 Encounters:   04/05/20 105 lb (47.6 kg)     Mental Status:  oriented and alert    IV Access:  - None    Nursing Mobility/ADLs:  Walking   Dependent  Transfer  Dependent  Bathing  Dependent  Dressing  Dependent  Toileting  Dependent  Feeding  Dependent  Med Admin  Dependent  Med Delivery   none    Wound Care Documentation and Therapy:  Wound 06/15/19 Back Medial;Upper red area with inner [place] tear (Active)   Number of days: 296        Elimination:  Continence:   · Bowel: No  · Bladder: No  Urinary Catheter: Insertion Date: 4/5/2020   Colostomy/Ileostomy/Ileal Conduit: No       Date of Last BM: 4/5/2020    Intake/Output Summary (Last 24 hours) at 4/6/2020 1453  Last data filed at 4/6/2020 1355  Gross per 24 hour   Intake 981.3 ml   Output 1825 ml   Net -843.7 ml     I/O last 3 completed shifts:   In: 604.9 [I.V.:504.9; IV Piggyback:100]  Out: 825 [Urine:825]    Safety Concerns:     Aspiration Risk    Impairments/Disabilities:      None    Nutrition

## 2020-04-06 NOTE — CARE COORDINATION
Patient was admitted from [facility] with Anthony Latif there, she states patient is a long term care bedhold. No precert is needed for her to return. SW following.

## 2020-04-06 NOTE — CONSULTS
Rio Rivas is a 80 y.o. right handed     Neurology was consulted for Acute left-sided stroke    Past Medical History:     Past Medical History:   Diagnosis Date    AS (ankylosing spondylitis) (HCC)     severe    Atrial fibrillation (HCC)     [facility]estive [facility]) [facility]     H/O TIA (transient ischemic attack) and stroke     Hyperlipidemia     Hypertension     Unspecified cerebral artery occlusion with cerebral infarction     Valvular heart disease        Past Surgical History:     Past Surgical History:   Procedure Laterality Date    HYSTERECTOMY         Allergies:     Aspirin    Medications:     Prior to Admission medications    Medication Sig Start Date End Date Taking? Authorizing Provider   warfarin (COUMADIN) 2.5 MG tablet Take 2.5 mg by mouth daily   Yes Historical Provider, MD   amLODIPine (NORVASC) 5 MG tablet Take 1 tablet by mouth daily 6/13/19  Yes Amanda Garcia MD   furosemide (LASIX) 20 MG tablet Take 1 tablet by mouth daily 6/13/19  Yes Amanda Garcia MD   sucralfate (CARAFATE) 1 GM tablet Take 1 tablet by mouth 4 times daily 6/7/19  Yes Yariel Ramirez DO   metoprolol tartrate (LOPRESSOR) 50 MG tablet Take 2 tablets 2 times daily 5/1/19  Yes Andrew oRjas MD   simvastatin (ZOCOR) 40 MG tablet Take 1 tablet by mouth nightly 5/1/19  Yes Andrew Rojas MD   lisinopril (PRINIVIL;ZESTRIL) 20 MG tablet TAKE ONE TABLET BY MOUTH TWO TIMES A DAY 4/17/19  Yes Andrew Rojas MD   Cholecalciferol (VITAMIN D) 2000 UNITS CAPS capsule Take 2,000 Units by mouth daily. Yes Historical Provider, MD   calcium carbonate (OSCAL) 500 MG TABS tablet Take 500 mg by mouth daily.      Yes Historical Provider, MD   warfarin (COUMADIN) 5 MG tablet 1 qd 6/15/19   Amanda Garcia MD   enoxaparin (LOVENOX) 40 MG/0.4ML injection Inject 0.5 mLs into the skin 2 times daily Stop when INR is 2.0 6/13/19   Amanda Garcia MD   ondansetron (ZOFRAN ODT) 4 MG disintegrating tablet Take 1 tablet by 06/09/2019     Phosphorus:  No results found for: PHOS  PT/INR:    Lab Results   Component Value Date    PROTIME 51.0 04/06/2020    PROTIME 24.9 06/03/2019    INR 4.4 04/06/2020     PTT:    Lab Results   Component Value Date    APTT 36.8 04/05/2020   [APTT}  Troponin:    Lab Results   Component Value Date    TROPONINI 0.03 04/05/2020       I independently reviewed the labs and imaging studies today    Assessment:     1. Acute stroke, Left M1 distribution with some M3 involvement  2. Anticoagulation with coumadin 2/2 atrial fibrillation        Plan:     1. Patient had significant stroke burden based on CTA and perfusion studies. MRI unlikely to provide any further information. CODE STATUS DNR CCA. 2.  Swallow evaluation needed. Concern for aspiration pneumonia based on imaging. Pulmonology is following. 3.  Overall prognosis patient is poor based on prior history and current stroke burden. Palliative care consult is pending. 4. Continue to monitor neurologic status. 5. PT/OT/SLP as ammy Steiner DO PGY-4  8:32 AM  4/6/2020       Attending Supervising Physician's Attestation Statement  I performed a history and physical examination on the patient and discussed the management with the resident physician. I reviewed and agree with the findings and plan as documented in his note . LMCA stroke. Large territory. Already concern for PNA. She does not appear to be protecting her airway all that well. Strongly consider transition to palliative care.    Kody Rivas MD        Electronically signed by Kody Rivas MD on 4/6/20 at 1:15 PM EDT

## 2020-04-06 NOTE — PROGRESS NOTES
Past Surgical History:        Procedure Laterality Date    HYSTERECTOMY         Allergies:  Aspirin    Family Goal: Comfort care    Meeting held with spoke with daughter Ciara Ramirez over the phone    Discharge Plan:  Discharge Disposition; ECF  Facility Name: [address] [place] [address],[address] [place] [address]    Referral received. Chart reviewed. Spoke with Ciara Ramirez, patient's daughter over the phone. Explained hospice philosophy and no longer pursuing any further aggressive treatment. Focusing on comfort care only. Explained hospice benefit and reviewed all levels of care including the hospice house for for short term symptom management. Once symptoms are managed patient would transfer to a routine level of care either home or ECF with hospice. Explained room and board would be private pay at the facility. Discussed roles and frequency visits of skilled nursing, personal care team, and SW.     Daughter would like patient to return back to [address] [place] [address]. with [employer]. Daughter would like hospice consents to be emailed to her at [email or web address]. Answered all questions relating to hospice care. Explained that this nurse will call daughter back once discharge plans are in place. Emotional support and active listening provided. Call placed to [address] and provided update. Call placed to [facility] intake department and provided [facility] with email address. She will email consents. Visit made to unit. Patient is receiving personal care. No distress noted. Updated charge nurse on discharge plan. Call received from [address] and [facility] stated patient can return today however does not need hospice. [facility] will care for patient's need. Call placed to Marti, patient's daughter and she confirmed plan with no hospice. Provided Marti with [employer] phone number and asked to call with any need once she gets back facility.  Call placed to [facility], [facility] intake department and cancelled referral.    Electronically signed by Lissette Rolon RN on 4/6/2020 at

## 2020-04-06 NOTE — PROGRESS NOTES
SPEECH/LANGUAGE PATHOLOGY  SPEECH/LANGUAGE/COGNITIVE EVALUATION      PATIENT NAME:  Kim Rene      :  1929          TODAY'S DATE:  2020 ROOM:  [address] [place] [address]     ADMITTING DIAGNOSIS: Acute cerebrovascular accident ([facility] [I63.9]  [facility] [I63.9]    SPEECH PATHOLOGY DIAGNOSIS:    Severe cognitive communication deficit    THERAPY RECOMMENDATIONS:   Speech Pathology intervention is recommended 3-5 times per week for LOS or when goals are met with emphasis on the following:     Expressive language skills to improve accuracy of completion of automatic speech tasks, object/picture naming, phrase completion, and phrasal expression of basic wants and needs with 25% accuracy  Improve receptive language skills for comprehension of simple level yes/no questions, basic commands (auditory/written format), and for comprehension of basic conversation                MOTOR SPEECH       Oral Peripheral Examination   Could not test    Parameters of Speech Production  Respiration:  Inadequate for speech production  Articulation:  Could not test  Resonance:  Could not test  Quality:   Could not test  Pitch:    Could not test  Intensity: Could not test  Fluency:  Could not test  Prosody Could not test    RECEPTIVE LANGUAGE    Comprehension of Yes/No Questions:   Unable    Process  Simple Verbal Commands:   Unable  Process Intermediate Verbal Commands:   Could not test  Process Complex Verbal Commands:     Could not test    Comprehension of Conversation:      Unable      EXPRESSIVE LANGUAGE     Serials: no response    Imitation:  Words   Impaired   Sentences Impaired    Naming:  (Modality used:  Verbal)  Confrontation Naming  Impaired  Functional Description  Impaired  Response Naming: Impaired    Conversation:      Patient was non verbal.      COGNITION     Attention/Orientation  Attention: Easily Distracted  Orientation:  No response to orientation questions.     Memory   Immediate Recall: Repeated 0/3    Delayed Recall:   Recalled 0/3    Long Term Recall:   No response    Organization/Problem Solving/Reasoning   Verbal Sequencing: To be assessed        Verbal Problem solving: To be assessed          CLINICAL OBSERVATIONS NOTED DURING THE EVALUATION  Patient was very difficult to arouse. Once aroused, she did not respond to simple yes/no questions or level one commands. No verbal response noted. The Speech Language Pathologist (SLP) completed education with the patient regarding results of evaluation. Explained that Speech Pathology intervention is warranted  at this time   Prognosis for improvements is fair -     This plan will be re-evaluated and revised in 1 week  if warranted. Patient stated goals: Could not state,   Treatment goals discussed with Patient   The Patient did not demonstrate understanding of the diagnosis, prognosis and plan of care       CPT code:    20177  eval speech sound lang comprehension      The admitting diagnosis and active problem list, as listed below have been reviewed prior to initiation of this evaluation. ACTIVE PROBLEM LIST:   Patient Active Problem List   Diagnosis    Valvular heart disease    Atrial fibrillation ([address]    Hyperlipidemia    Hypertension    Hypokalemia    Long term current use of anticoagulant therapy    Encounter for therapeutic drug monitoring    Cerebral artery occlusion with cerebral infarction ([address]    Small bowel obstruction (HCC)    Hyponatremia    Acute on chronic diastolic congestive heart failure (HCC)    Acute cerebrovascular accident ([address]    Acute CVA (cerebrovascular accident) ([address]    Cerebrovascular accident (CVA) due to embolism of left middle cerebral artery ([address]    Acute ischemic left middle cerebral artery (MCA) stroke (HCC)    Nihss score 19    Anticoagulated on warfarin         Naty Adams M.S., CCC-SLP/L  Speech-Language Pathologist

## 2020-04-06 NOTE — PROGRESS NOTES
ABG drawn x 1 from Left Radial. Patient had NormalAllen's Test.  Patient was on 7 liters/min via high flow nasal cannula  at time of puncture. Pressure held for 5. No bleeding or bruising noted at puncture site.   Patient tolerated procedure well      Performed by Devorah Huerta

## 2020-04-06 NOTE — PROGRESS NOTES
Physical Therapy    PT orders received and chart reviewed. Pt flushed and lethargic upon arrival, discussed with RN who requests pt to be held at this time. PT will follow and attempt again at later time/date as appropriate. Thank you.     Elise Haskins, PT, DPT  [id number]

## 2020-04-10 LAB
BLOOD CULTURE, ROUTINE: NORMAL
CULTURE, BLOOD 2: NORMAL